# Patient Record
Sex: FEMALE | Race: OTHER | Employment: OTHER | URBAN - METROPOLITAN AREA
[De-identification: names, ages, dates, MRNs, and addresses within clinical notes are randomized per-mention and may not be internally consistent; named-entity substitution may affect disease eponyms.]

---

## 2017-02-18 LAB
ALBUMIN SERPL-MCNC: 4.2 G/DL (ref 3.6–4.8)
ALBUMIN/GLOB SERPL: 1.6 {RATIO} (ref 1.1–2.5)
ALP SERPL-CCNC: 77 IU/L (ref 39–117)
ALT SERPL-CCNC: 24 IU/L (ref 0–32)
AST SERPL-CCNC: 20 IU/L (ref 0–40)
BASOPHILS # BLD MANUAL: 0.1 X10E3/UL (ref 0–0.2)
BASOPHILS NFR BLD MANUAL: 1 %
BILIRUB SERPL-MCNC: 0.2 MG/DL (ref 0–1.2)
BUN SERPL-MCNC: 15 MG/DL (ref 8–27)
BUN/CREAT SERPL: 20 (ref 11–26)
CALCIUM SERPL-MCNC: 9.5 MG/DL (ref 8.7–10.3)
CHLORIDE SERPL-SCNC: 96 MMOL/L (ref 96–106)
CO2 SERPL-SCNC: 22 MMOL/L (ref 18–29)
CREAT SERPL-MCNC: 0.76 MG/DL (ref 0.57–1)
DIFFERENTIAL COMMENT, 115260: NORMAL
EOSINOPHIL # BLD MANUAL: 0.2 X10E3/UL (ref 0–0.4)
EOSINOPHIL NFR BLD MANUAL: 3 %
ERYTHROCYTE [DISTWIDTH] IN BLOOD BY AUTOMATED COUNT: 14.5 % (ref 12.3–15.4)
ERYTHROCYTE [SEDIMENTATION RATE] IN BLOOD BY WESTERGREN METHOD: 4 MM/HR (ref 0–40)
GLOBULIN SER CALC-MCNC: 2.6 G/DL (ref 1.5–4.5)
GLUCOSE SERPL-MCNC: 89 MG/DL (ref 65–99)
HCT VFR BLD AUTO: 41.9 % (ref 34–46.6)
HGB BLD-MCNC: 13.7 G/DL (ref 11.1–15.9)
LYMPHOCYTES # BLD MANUAL: 1.8 X10E3/UL (ref 0.7–3.1)
LYMPHOCYTES NFR BLD MANUAL: 33 %
MCH RBC QN AUTO: 28.4 PG (ref 26.6–33)
MCHC RBC AUTO-ENTMCNC: 32.7 G/DL (ref 31.5–35.7)
MCV RBC AUTO: 87 FL (ref 79–97)
MONOCYTES # BLD MANUAL: 0.8 X10E3/UL (ref 0.1–0.9)
MONOCYTES NFR BLD MANUAL: 14 %
NEUTROPHILS # BLD MANUAL: 2.7 X10E3/UL (ref 1.4–7)
NEUTROPHILS NFR BLD MANUAL: 49 %
PLATELET # BLD AUTO: 304 X10E3/UL (ref 150–379)
PLATELET BLD QL SMEAR: ADEQUATE
POTASSIUM SERPL-SCNC: 4.1 MMOL/L (ref 3.5–5.2)
PROT SERPL-MCNC: 6.8 G/DL (ref 6–8.5)
RBC # BLD AUTO: 4.83 X10E6/UL (ref 3.77–5.28)
RBC MORPH BLD: NORMAL
SODIUM SERPL-SCNC: 140 MMOL/L (ref 134–144)
WBC # BLD AUTO: 5.5 X10E3/UL (ref 3.4–10.8)

## 2017-04-14 ENCOUNTER — TELEPHONE (OUTPATIENT)
Dept: RHEUMATOLOGY | Age: 72
End: 2017-04-14

## 2017-04-14 DIAGNOSIS — Z79.60 LONG-TERM USE OF IMMUNOSUPPRESSANT MEDICATION: ICD-10-CM

## 2017-04-14 DIAGNOSIS — M06.09 SERONEGATIVE RHEUMATOID ARTHRITIS OF MULTIPLE SITES (HCC): Primary | ICD-10-CM

## 2017-04-15 LAB
ALBUMIN SERPL-MCNC: 4 G/DL (ref 3.5–4.8)
ALBUMIN/GLOB SERPL: 1.9 {RATIO} (ref 1.2–2.2)
ALP SERPL-CCNC: 72 IU/L (ref 39–117)
ALT SERPL-CCNC: 22 IU/L (ref 0–32)
AST SERPL-CCNC: 25 IU/L (ref 0–40)
BASOPHILS # BLD MANUAL: 0 X10E3/UL (ref 0–0.2)
BASOPHILS NFR BLD MANUAL: 0 %
BILIRUB SERPL-MCNC: 0.4 MG/DL (ref 0–1.2)
BUN SERPL-MCNC: 13 MG/DL (ref 8–27)
BUN/CREAT SERPL: 16 (ref 12–28)
CALCIUM SERPL-MCNC: 9.2 MG/DL (ref 8.7–10.3)
CHLORIDE SERPL-SCNC: 96 MMOL/L (ref 96–106)
CO2 SERPL-SCNC: 26 MMOL/L (ref 18–29)
CREAT SERPL-MCNC: 0.8 MG/DL (ref 0.57–1)
DIFFERENTIAL COMMENT, 115260: NORMAL
EOSINOPHIL # BLD MANUAL: 0.1 X10E3/UL (ref 0–0.4)
EOSINOPHIL NFR BLD MANUAL: 3 %
ERYTHROCYTE [DISTWIDTH] IN BLOOD BY AUTOMATED COUNT: 15.3 % (ref 12.3–15.4)
ERYTHROCYTE [SEDIMENTATION RATE] IN BLOOD BY WESTERGREN METHOD: 2 MM/HR (ref 0–40)
GLOBULIN SER CALC-MCNC: 2.1 G/DL (ref 1.5–4.5)
GLUCOSE SERPL-MCNC: 92 MG/DL (ref 65–99)
HCT VFR BLD AUTO: 39.3 % (ref 34–46.6)
HGB BLD-MCNC: 13.2 G/DL (ref 11.1–15.9)
LYMPHOCYTES # BLD MANUAL: 1.4 X10E3/UL (ref 0.7–3.1)
LYMPHOCYTES NFR BLD MANUAL: 34 %
MCH RBC QN AUTO: 28.9 PG (ref 26.6–33)
MCHC RBC AUTO-ENTMCNC: 33.6 G/DL (ref 31.5–35.7)
MCV RBC AUTO: 86 FL (ref 79–97)
MONOCYTES # BLD MANUAL: 0.8 X10E3/UL (ref 0.1–0.9)
MONOCYTES NFR BLD MANUAL: 19 %
NEUTROPHILS # BLD MANUAL: 1.8 X10E3/UL (ref 1.4–7)
NEUTROPHILS NFR BLD MANUAL: 44 %
PLATELET # BLD AUTO: 209 X10E3/UL (ref 150–379)
PLATELET BLD QL SMEAR: ADEQUATE
POTASSIUM SERPL-SCNC: 4.4 MMOL/L (ref 3.5–5.2)
PROT SERPL-MCNC: 6.1 G/DL (ref 6–8.5)
RBC # BLD AUTO: 4.56 X10E6/UL (ref 3.77–5.28)
RBC MORPH BLD: NORMAL
SODIUM SERPL-SCNC: 138 MMOL/L (ref 134–144)
WBC # BLD AUTO: 4.2 X10E3/UL (ref 3.4–10.8)

## 2017-04-21 ENCOUNTER — OFFICE VISIT (OUTPATIENT)
Dept: RHEUMATOLOGY | Age: 72
End: 2017-04-21

## 2017-04-21 VITALS
BODY MASS INDEX: 28.96 KG/M2 | HEIGHT: 66 IN | WEIGHT: 180.2 LBS | HEART RATE: 64 BPM | TEMPERATURE: 97.2 F | SYSTOLIC BLOOD PRESSURE: 142 MMHG | DIASTOLIC BLOOD PRESSURE: 72 MMHG | RESPIRATION RATE: 20 BRPM | OXYGEN SATURATION: 97 %

## 2017-04-21 DIAGNOSIS — R79.89 ELEVATED LIVER FUNCTION TESTS: ICD-10-CM

## 2017-04-21 DIAGNOSIS — Z79.60 LONG-TERM USE OF IMMUNOSUPPRESSANT MEDICATION: ICD-10-CM

## 2017-04-21 DIAGNOSIS — M06.09 SERONEGATIVE RHEUMATOID ARTHRITIS OF MULTIPLE SITES (HCC): Primary | Chronic | ICD-10-CM

## 2017-04-21 RX ORDER — PREDNISONE 5 MG/1
TABLET ORAL
Qty: 30 TAB | Refills: 0 | Status: SHIPPED | OUTPATIENT
Start: 2017-04-21 | End: 2021-06-30 | Stop reason: ALTCHOICE

## 2017-04-21 NOTE — PROGRESS NOTES
HISTORY OF PRESENT ILLNESS  Aleksandra Dailey is a 81 y.o. female. HPI Patient presents for follow up rheumatoid arthritis. \"I'm having a worse time with my hands. \" Symptoms have been worse over the past few months. She has some pain in the neck and shoulders as well. She has AM stiffness of less than 30 minutes. She has swelling of the fingers and left ankle. She notes no recent fevers or infections. She is taking methotrexate 10 mg weekly. She takes acetaminophen up to 1300 mg daily, if needed, with mild relief. No regular exercise is noted. Current Outpatient Prescriptions   Medication Sig Dispense Refill    tiZANidine (ZANAFLEX) 2 mg tablet Take 1 Tab by mouth nightly as needed. Will make drowsy (not for use with any sleep medications) 30 Tab 1    methotrexate (RHEUMATREX) 2.5 mg tablet TAKE 6 TABLETS BY MOUTH EVERY WEEK ON THURSDAY (Patient taking differently: patient states she is on 4 tablets once weekly 7/2016) 78 Tab 0    pantoprazole (PROTONIX) 40 mg tablet TK 1 T PO QD  2    folic acid (FOLVITE) 1 mg tablet Take 1 Tab by mouth daily. 90 Tab 3    modafinil (PROVIGIL) 200 mg tablet Take 200 mg by mouth daily as needed. 1    acetaminophen (TYLENOL) 650 mg CR tablet Take 1,300 mg by mouth daily as needed for Pain.  citalopram (CELEXA) 20 mg tablet Take  by mouth daily.  CALCIUM PHOSPHATE TRIB/VIT D3 (CITRACAL + D PO) Take 630 mg by mouth daily. D3 = 500 IU.  fluticasone (FLONASE) 50 mcg/Actuation nasal spray 1 [place] by Both Nostrils route two (2) times a day.  lisinopril (PRINIVIL, ZESTRIL) 20 mg tablet Take 20 mg by mouth daily.  hydrochlorothiazide (HYDRODIURIL) 25 mg tablet Take 25 mg by mouth daily.  loratadine (CLARITIN) 10 mg tablet Take 10 mg by mouth daily as needed.  cpap machine kit 1 Each by Does Not Apply route.  CPAP @@ 12 cm H2O qhs   Indications: SLEEP APNEA      VAGIFEM 10 mcg tab vaginal tablet INSERT 1 T VAGINALLY TWICE WEEKLY FOR 30 DAYS  3 Allergies   Allergen Reactions    Cephalosporins Unknown (comments)     Pt states she is not allergic to this.  Cyclobenzaprine Other (comments)     dysphoria    Keflex [Cephalexin] Anxiety    Plaquenil [Hydroxychloroquine] Diarrhea    Sulfa (Sulfonamide Antibiotics) Unknown (comments)     Unsure of reaction. Review of Systems   Constitutional: Negative for fever. Eyes: Negative for blurred vision. Respiratory: Negative for cough. Cardiovascular: Positive for leg swelling. Gastrointestinal: Negative for abdominal pain. Physical Exam   Vitals reviewed. GENERAL: well developed, well nourished, in no apparent distress    E/N/T: Oropharynx: normal mucosa, palate, and posterior pharynx;    NECK: Neck is supple with mildly reduced bilateral lateral rotation with pain. Tenderness lower cervical paraspinals with spasm  RESPIRATORY:  Lungs clear with BS=B/L    CARDIOVASCULAR: regular rhythm; right full leg compression stocking in place; no edema left leg.; +1 edema right leg    GASTROINTESTINAL: nontender; no organomegaly    LYMPHATIC: no enlargement of cervical nodes;    MUSCULOSKELETAL: digits/nails: squaring right first Aia 16; Heberden's and Bonnie's nodes each hand. Normal gait; range of motion: right knee flexion 90 degrees. Rest of peripheral joints FROM. Full peripheral joint examination with synovitis left 2nd MCP, right 2-3 MCPS, bilateral hands 2-5 pips, left ankle and mtps.    NEURO: tone normal  SKIN: no ulcerations, lesions or rashes no skin thickening   PSYCHIATRIC: mental status: alert and oriented x 3; good insight and judgement    Lab Results   Component Value Date/Time    WBC 4.2 04/14/2017 11:52 AM    HGB 13.2 04/14/2017 11:52 AM    HCT 39.3 04/14/2017 11:52 AM    PLATELET 676 54/79/2072 11:52 AM    MCV 86 04/14/2017 11:52 AM     Lab Results   Component Value Date/Time    Sodium 138 04/14/2017 11:52 AM    Potassium 4.4 04/14/2017 11:52 AM    Chloride 96 04/14/2017 11:52 AM CO2 26 04/14/2017 11:52 AM    Anion gap 7 11/18/2011 09:15 AM    Glucose 92 04/14/2017 11:52 AM    BUN 13 04/14/2017 11:52 AM    Creatinine 0.80 04/14/2017 11:52 AM    BUN/Creatinine ratio 16 04/14/2017 11:52 AM    GFR est AA 86 04/14/2017 11:52 AM    GFR est non-AA 75 04/14/2017 11:52 AM    Calcium 9.2 04/14/2017 11:52 AM    Bilirubin, total 0.4 04/14/2017 11:52 AM    AST (SGOT) 25 04/14/2017 11:52 AM    Alk. phosphatase 72 04/14/2017 11:52 AM    Protein, total 6.1 04/14/2017 11:52 AM    Albumin 4.0 04/14/2017 11:52 AM    Globulin 2.7 11/18/2011 09:15 AM    A-G Ratio 1.9 04/14/2017 11:52 AM    ALT (SGPT) 22 04/14/2017 11:52 AM     Lab Results   Component Value Date/Time    Sed rate (ESR) 2 04/14/2017 11:52 AM     MHAQ zero  CDAI 4/21/2017 12/13/2016 8/15/2016   Swollen Joint Count 11 3 0   Tender Joint Count 11 5 0   Physician Assessment (0-10) 4 3 2   Patient Assessment (0-10) 4.5 4 4   CDAI 30.5 15 6     ASSESSMENT and PLAN    ICD-10-CM ICD-9-CM    1. Seronegative rheumatoid arthritis of multiple sites [facility]): Seronegative. Inflammatory markers elevated at baseline with good response to methotrexate. She had lowered from 15 mg weekly to 10 mg weekly due to mildly increased ALT. Symptoms increased from last visit. M06.09 714.0 -discussed consideration of adding Arava or returning to methotrexate 15 mg weekly. She will increase methotrexate to 15 mg weekly  -she is leery of [facility]. If needed, consider Enbrel if not improving or if liver testing increases with higher methotrexate dosing  -comfortable, low impact exercise encouraged  -prednisone 10 mg once daily. Taper by 2.5 mg every 3 days or as directed   2. Long-term use of immunosuppressant medication Z79.899 V58.69 HEPATIC FUNCTION PANEL   3. Elevated liver function tests: mild, 3 times last year.  Normalized after lowering methotrexate R94.5 790.6 HEPATIC FUNCTION PANEL-4 weeks  See above  If increased after returning to methotrexate 15 mg weekly, return to 10 mg weekly and add different arthritis therapy

## 2017-04-21 NOTE — MR AVS SNAPSHOT
Visit Information Date & Time Provider Department Dept. Phone Encounter #  
 4/21/2017 11:30 AM Calin Lux MD [facility] [id number] Follow-up Instructions Return in about 2 months (around 6/21/2017). Upcoming Health Maintenance Date Due Hepatitis C Screening 1945 DTaP/Tdap/Td series (1 - Tdap) 2/26/1968 FOBT Q 1 YEAR AGE 50-75 2/26/1997 ZOSTER VACCINE AGE 60> 2/26/2007 MEDICARE YEARLY EXAM 6/19/2017 GLAUCOMA SCREENING Q2Y 9/14/2018 BREAST CANCER SCRN MAMMOGRAM 12/7/2018 Allergies as of 4/21/2017  Review Complete On: 4/21/2017 By: Calin Lux MD  
  
 Severity Noted Reaction Type Reactions Cephalosporins  01/24/2012    Unknown (comments) Pt states she is not allergic to this. Cyclobenzaprine  12/16/2015    Other (comments) dysphoria Keflex [Cephalexin]  04/18/2013    Anxiety Plaquenil [Hydroxychloroquine]  10/16/2013    Diarrhea Sulfa (Sulfonamide Antibiotics)  04/18/2013    Unknown (comments) Unsure of reaction. Current Immunizations  Reviewed on 12/13/2016 Name Date Influenza Vaccine 8/31/2016, 10/16/2015, 9/15/2014, 9/23/2013 Influenza Vaccine Whole 10/14/2011 Pneumococcal Vaccine (Unspecified Type) 10/27/2014, 5/27/2009 Not reviewed this visit You Were Diagnosed With   
  
 Codes Comments Seronegative rheumatoid arthritis of multiple sites [facility])    -  Primary ICD-10-CM: M06.09 
ICD-9-CM: 714.0 Long-term use of immunosuppressant medication     ICD-10-CM: Z79.899 ICD-9-CM: V58.69 Elevated liver function tests     ICD-10-CM: R94.5 ICD-9-CM: 790.6 Vitals BP Pulse Temp Resp Height(growth percentile) Weight(growth percentile) 142/72 (BP 1 Location: Left arm, BP Patient Position: Sitting) 64 97.2 °F (36.2 °C) (Oral) 20 5' 5.5\" (1.664 m) 180 lb 3.2 oz (81.7 kg) SpO2 BMI OB Status Smoking Status 97% 29.53 kg/m2 Postmenopausal Former Smoker Vitals History BMI and BSA Data Body Mass Index Body Surface Area  
 29.53 kg/m 2 1.94 m 2 Preferred Pharmacy Pharmacy Name Phone [facility] [address], [address] [facility] AT [address] [phone] Your Updated Medication List  
  
   
This list is accurate as of: 4/21/17 12:05 PM.  Always use your most recent med list.  
  
  
  
  
 acetaminophen 650 mg CR tablet Commonly known as:  TYLENOL Take 1,300 mg by mouth daily as needed for Pain. CeleXA 20 mg tablet Generic drug:  citalopram  
Take  by mouth daily. CITRACAL + D PO Take 630 mg by mouth daily. D3 = 500 IU. CLARITIN 10 mg tablet Generic drug:  loratadine Take 10 mg by mouth daily as needed. cpap machine kit 1 Each by Does Not Apply route. CPAP @@ 12 cm H2O qhs   Indications: SLEEP APNEA  
  
 fluticasone 50 mcg/actuation nasal spray Commonly known as:  FLONASE  
1 [place] by Both Nostrils route two (2) times a day. folic acid 1 mg tablet Commonly known as:  [employer] Take 1 Tab by mouth daily. hydroCHLOROthiazide 25 mg tablet Commonly known as:  HYDRODIURIL Take 25 mg by mouth daily. lisinopril 20 mg tablet Commonly known as:  Fredy Peterson Take 20 mg by mouth daily. methotrexate 2.5 mg tablet Commonly known as:  RHEUMATREX  
TAKE 6 TABLETS BY MOUTH EVERY WEEK ON THURSDAY  
  
 modafinil 200 mg tablet Commonly known as:  PROVIGIL Take 200 mg by mouth daily as needed. pantoprazole 40 mg tablet Commonly known as:  PROTONIX TK 1 T PO QD  
  
 predniSONE 5 mg tablet Commonly known as:  DELTASONE  
10 mg once daily. Taper by 2.5 mg every 3 days or as directed  
  
 tiZANidine 2 mg tablet Commonly known as:  Jerson Core Take 1 Tab by mouth nightly as needed. Will make drowsy (not for use with any sleep medications) VAGIFEM 10 mcg Tab vaginal tablet Generic drug:  estradiol INSERT 1 T VAGINALLY TWICE WEEKLY FOR 30 DAYS Prescriptions Sent to Pharmacy Refills  
 predniSONE (DELTASONE) 5 mg tablet 0 Sig: 10 mg once daily. Taper by 2.5 mg every 3 days or as directed Class: Normal  
 Pharmacy: [facility] [address], [facility] [address]  #: [phone] Follow-up Instructions Return in about 2 months (around 6/21/2017). To-Do List   
 05/22/2017 Lab:  HEPATIC FUNCTION PANEL Patient Instructions Methotrexate to 6 pills once weekly Liver testing about 4 weeks Introducing [facility] SERVICES! Dear Jono : 
Thank you for requesting a [facility] account. Our records indicate that you already have an active [facility] account. You can access your account anytime at [email or web address] Did you know that you can access your hospital and ER discharge instructions at any time in [facility]? You can also review all of your test results from your hospital stay or ER visit. Additional Information If you have questions, please visit the Frequently Asked Questions section of the [facility] website at [email or web address] Remember, [facility] is NOT to be used for urgent needs. For medical emergencies, dial 911. Now available from your iPhone and Android! Please provide this summary of care documentation to your next provider. Your primary care clinician is listed as Jamila Recinos. If you have any questions after today's visit, please call [phone].

## 2017-05-25 NOTE — LETTER
6/15/2017 12:31 PM 
 
Ms. Nain Mixon [address] [facility]. [place] [address] Dear Nain Mixon: We have been attempting to reach you but have been unsuccessful. Please find your most recent results below. Resulted Orders HEPATIC FUNCTION PANEL Result Value Ref Range Protein, total 6.2 6.0 - 8.5 g/dL Albumin 4.1 3.5 - 4.8 g/dL Bilirubin, total 0.3 0.0 - 1.2 mg/dL Bilirubin, direct 0.12 0.00 - 0.40 mg/dL Alk. phosphatase 67 39 - 117 IU/L  
 AST (SGOT) 23 0 - 40 IU/L  
 ALT (SGPT) 26 0 - 32 IU/L Narrative Performed at:  [address]  [id number] : Kika Aguillon MD, Phone:  [phone] CBC+PLATELET+HEM REVIEW Result Value Ref Range WBC 3.6 3.4 - 10.8 x10E3/uL  
 RBC 4.69 3.77 - 5.28 x10E6/uL HGB 13.4 11.1 - 15.9 g/dL HCT 39.8 34.0 - 46.6 % MCV 85 79 - 97 fL  
 MCH 28.6 26.6 - 33.0 pg  
 MCHC 33.7 31.5 - 35.7 g/dL  
 RDW 15.0 12.3 - 15.4 % PLATELET 605 927 - 428 x10E3/uL NEUTROPHILS 47 % LYMPHOCYTES 34 % MONOCYTES 17 % EOSINOPHILS 2 % BASOPHILS 0 %  
 ABS. NEUTROPHILS 1.7 1.4 - 7.0 X10E3/uL  
 ABS. LYMPHOCYTES 1.2 0.7 - 3.1 X10E3/uL  
 ABS. MONOCYTES 0.6 0.1 - 0.9 X10E3/uL  
 ABS. EOSINOPHILS 0.1 0.0 - 0.4 X10E3/uL  
 ABS. BASOPHILS 0.0 0.0 - 0.2 X10E3/uL Differential comment Comment Comment:  
   Verified by microscopic examination. RBC COMMENT RBC's appear normal. Normal  
 PLATELET COMMENT Adequate Adequate Narrative Performed at:  [address]  [id number] : Kika Aguillon MD, Phone:  [phone] METABOLIC PANEL, COMPREHENSIVE Result Value Ref Range Glucose 109 (H) 65 - 99 mg/dL BUN 16 8 - 27 mg/dL Creatinine 0.80 0.57 - 1.00 mg/dL GFR est non-AA 75 >59 mL/min/1.73 GFR est AA 86 >59 mL/min/1.73  
 BUN/Creatinine ratio 20 12 - 28  Sodium 137 134 - 144 mmol/L  
 Potassium 4.4 3.5 - 5.2 mmol/L Chloride 97 96 - 106 mmol/L  
 CO2 27 18 - 29 mmol/L Calcium 9.3 8.7 - 10.3 mg/dL Protein, total 6.1 6.0 - 8.5 g/dL Albumin 3.9 3.5 - 4.8 g/dL GLOBULIN, TOTAL 2.2 1.5 - 4.5 g/dL A-G Ratio 1.8 1.2 - 2.2 Bilirubin, total 0.3 0.0 - 1.2 mg/dL Alk. phosphatase 74 39 - 117 IU/L  
 AST (SGOT) 26 0 - 40 IU/L  
 ALT (SGPT) 30 0 - 32 IU/L Narrative Performed at:  [address]  [id number] : Patricia Medrano MD, Phone:  [phone] SED RATE (ESR) Result Value Ref Range Sed rate (ESR) 2 0 - 40 mm/hr Narrative Performed at:  [address]  [id number] : Patricia Medrano MD, Phone:  [phone] RECOMMENDATIONS/COMMENTS: 
Your glucose was 109. The rest of your labs were okay. Please call me if you have any questions: [phone] Sincerely, Mago Herrmann MD

## 2017-05-26 LAB
ALBUMIN SERPL-MCNC: 4.1 G/DL (ref 3.5–4.8)
ALP SERPL-CCNC: 67 IU/L (ref 39–117)
ALT SERPL-CCNC: 26 IU/L (ref 0–32)
AST SERPL-CCNC: 23 IU/L (ref 0–40)
BILIRUB DIRECT SERPL-MCNC: 0.12 MG/DL (ref 0–0.4)
BILIRUB SERPL-MCNC: 0.3 MG/DL (ref 0–1.2)
PROT SERPL-MCNC: 6.2 G/DL (ref 6–8.5)

## 2017-06-14 LAB
ALBUMIN SERPL-MCNC: 3.9 G/DL (ref 3.5–4.8)
ALBUMIN/GLOB SERPL: 1.8 {RATIO} (ref 1.2–2.2)
ALP SERPL-CCNC: 74 IU/L (ref 39–117)
ALT SERPL-CCNC: 30 IU/L (ref 0–32)
AST SERPL-CCNC: 26 IU/L (ref 0–40)
BASOPHILS # BLD MANUAL: 0 X10E3/UL (ref 0–0.2)
BASOPHILS NFR BLD MANUAL: 0 %
BILIRUB SERPL-MCNC: 0.3 MG/DL (ref 0–1.2)
BUN SERPL-MCNC: 16 MG/DL (ref 8–27)
BUN/CREAT SERPL: 20 (ref 12–28)
CALCIUM SERPL-MCNC: 9.3 MG/DL (ref 8.7–10.3)
CHLORIDE SERPL-SCNC: 97 MMOL/L (ref 96–106)
CO2 SERPL-SCNC: 27 MMOL/L (ref 18–29)
CREAT SERPL-MCNC: 0.8 MG/DL (ref 0.57–1)
DIFFERENTIAL COMMENT, 115260: NORMAL
EOSINOPHIL # BLD MANUAL: 0.1 X10E3/UL (ref 0–0.4)
EOSINOPHIL NFR BLD MANUAL: 2 %
ERYTHROCYTE [DISTWIDTH] IN BLOOD BY AUTOMATED COUNT: 15 % (ref 12.3–15.4)
ERYTHROCYTE [SEDIMENTATION RATE] IN BLOOD BY WESTERGREN METHOD: 2 MM/HR (ref 0–40)
GLOBULIN SER CALC-MCNC: 2.2 G/DL (ref 1.5–4.5)
GLUCOSE SERPL-MCNC: 109 MG/DL (ref 65–99)
HCT VFR BLD AUTO: 39.8 % (ref 34–46.6)
HGB BLD-MCNC: 13.4 G/DL (ref 11.1–15.9)
LYMPHOCYTES # BLD MANUAL: 1.2 X10E3/UL (ref 0.7–3.1)
LYMPHOCYTES NFR BLD MANUAL: 34 %
MCH RBC QN AUTO: 28.6 PG (ref 26.6–33)
MCHC RBC AUTO-ENTMCNC: 33.7 G/DL (ref 31.5–35.7)
MCV RBC AUTO: 85 FL (ref 79–97)
MONOCYTES # BLD MANUAL: 0.6 X10E3/UL (ref 0.1–0.9)
MONOCYTES NFR BLD MANUAL: 17 %
NEUTROPHILS # BLD MANUAL: 1.7 X10E3/UL (ref 1.4–7)
NEUTROPHILS NFR BLD MANUAL: 47 %
PLATELET # BLD AUTO: 209 X10E3/UL (ref 150–379)
PLATELET BLD QL SMEAR: ADEQUATE
POTASSIUM SERPL-SCNC: 4.4 MMOL/L (ref 3.5–5.2)
PROT SERPL-MCNC: 6.1 G/DL (ref 6–8.5)
RBC # BLD AUTO: 4.69 X10E6/UL (ref 3.77–5.28)
RBC MORPH BLD: NORMAL
SODIUM SERPL-SCNC: 137 MMOL/L (ref 134–144)
WBC # BLD AUTO: 3.6 X10E3/UL (ref 3.4–10.8)

## 2017-06-15 NOTE — PROGRESS NOTES
Left message for patient to return phone call. Reason for call:  Results/instructions:  2nd Attempt; letter sent.

## 2017-07-30 RX ORDER — ATROPINE SULFATE 0.1 MG/ML
0.5 INJECTION INTRAVENOUS
Status: CANCELLED | OUTPATIENT
Start: 2017-07-30 | End: 2017-07-30

## 2017-07-30 RX ORDER — DEXTROMETHORPHAN/PSEUDOEPHED 2.5-7.5/.8
1.2 DROPS ORAL
Status: CANCELLED | OUTPATIENT
Start: 2017-07-30

## 2017-07-30 RX ORDER — SODIUM CHLORIDE 0.9 % (FLUSH) 0.9 %
5-10 SYRINGE (ML) INJECTION EVERY 8 HOURS
Status: CANCELLED | OUTPATIENT
Start: 2017-07-30 | End: 2017-07-30

## 2017-07-30 RX ORDER — SODIUM CHLORIDE 0.9 % (FLUSH) 0.9 %
5-10 SYRINGE (ML) INJECTION AS NEEDED
Status: CANCELLED | OUTPATIENT
Start: 2017-07-30 | End: 2017-07-30

## 2017-07-30 RX ORDER — SODIUM CHLORIDE 9 MG/ML
150 INJECTION, SOLUTION INTRAVENOUS CONTINUOUS
Status: CANCELLED | OUTPATIENT
Start: 2017-07-30 | End: 2017-07-30

## 2017-07-30 RX ORDER — EPINEPHRINE 0.1 MG/ML
1 INJECTION INTRACARDIAC; INTRAVENOUS
Status: CANCELLED | OUTPATIENT
Start: 2017-07-30 | End: 2017-07-30

## 2017-07-30 RX ORDER — MIDAZOLAM HYDROCHLORIDE 1 MG/ML
.25-5 INJECTION, SOLUTION INTRAMUSCULAR; INTRAVENOUS
Status: CANCELLED | OUTPATIENT
Start: 2017-07-30 | End: 2017-07-30

## 2017-07-31 ENCOUNTER — ANESTHESIA EVENT (OUTPATIENT)
Dept: ENDOSCOPY | Age: 72
End: 2017-07-31
Payer: MEDICARE

## 2017-08-01 ENCOUNTER — ANESTHESIA (OUTPATIENT)
Dept: ENDOSCOPY | Age: 72
End: 2017-08-01
Payer: MEDICARE

## 2017-08-01 ENCOUNTER — HOSPITAL ENCOUNTER (OUTPATIENT)
Age: 72
Setting detail: OUTPATIENT SURGERY
Discharge: HOME | End: 2017-08-01
Attending: INTERNAL MEDICINE | Admitting: INTERNAL MEDICINE
Payer: MEDICARE

## 2017-08-01 VITALS
OXYGEN SATURATION: 100 % | WEIGHT: 180 LBS | SYSTOLIC BLOOD PRESSURE: 164 MMHG | DIASTOLIC BLOOD PRESSURE: 84 MMHG | BODY MASS INDEX: 29.5 KG/M2 | HEART RATE: 65 BPM | TEMPERATURE: 98.1 F | RESPIRATION RATE: 24 BRPM

## 2017-08-01 LAB
H PYLORI FROM TISSUE: NEGATIVE
KIT LOT NO., HCLOLOT: NORMAL
NEGATIVE CONTROL: NEGATIVE
POSITIVE CONTROL: POSITIVE

## 2017-08-01 PROCEDURE — 74011000250 HC RX REV CODE- 250

## 2017-08-01 PROCEDURE — 87077 CULTURE AEROBIC IDENTIFY: CPT | Performed by: INTERNAL MEDICINE

## 2017-08-01 PROCEDURE — 74011250636 HC RX REV CODE- 250/636

## 2017-08-01 PROCEDURE — 76060000031 HC ANESTHESIA FIRST 0.5 HR: Performed by: INTERNAL MEDICINE

## 2017-08-01 PROCEDURE — 88305 TISSUE EXAM BY PATHOLOGIST: CPT | Performed by: INTERNAL MEDICINE

## 2017-08-01 PROCEDURE — 77030009426 HC FCPS BIOP ENDOSC BSC -B: Performed by: INTERNAL MEDICINE

## 2017-08-01 PROCEDURE — 76040000019: Performed by: INTERNAL MEDICINE

## 2017-08-01 RX ORDER — PROPOFOL 10 MG/ML
INJECTION, EMULSION INTRAVENOUS AS NEEDED
Status: DISCONTINUED | OUTPATIENT
Start: 2017-08-01 | End: 2017-08-01 | Stop reason: HOSPADM

## 2017-08-01 RX ORDER — SODIUM CHLORIDE 9 MG/ML
INJECTION, SOLUTION INTRAVENOUS
Status: DISCONTINUED | OUTPATIENT
Start: 2017-08-01 | End: 2017-08-01 | Stop reason: HOSPADM

## 2017-08-01 RX ORDER — LIDOCAINE HYDROCHLORIDE 20 MG/ML
INJECTION, SOLUTION EPIDURAL; INFILTRATION; INTRACAUDAL; PERINEURAL AS NEEDED
Status: DISCONTINUED | OUTPATIENT
Start: 2017-08-01 | End: 2017-08-01 | Stop reason: HOSPADM

## 2017-08-01 RX ADMIN — PROPOFOL 50 MG: 10 INJECTION, EMULSION INTRAVENOUS at 09:55

## 2017-08-01 RX ADMIN — PROPOFOL 50 MG: 10 INJECTION, EMULSION INTRAVENOUS at 09:57

## 2017-08-01 RX ADMIN — LIDOCAINE HYDROCHLORIDE 40 MG: 20 INJECTION, SOLUTION EPIDURAL; INFILTRATION; INTRACAUDAL; PERINEURAL at 09:55

## 2017-08-01 RX ADMIN — PROPOFOL 20 MG: 10 INJECTION, EMULSION INTRAVENOUS at 09:59

## 2017-08-01 RX ADMIN — SODIUM CHLORIDE: 9 INJECTION, SOLUTION INTRAVENOUS at 09:52

## 2017-08-01 NOTE — ANESTHESIA POSTPROCEDURE EVALUATION
Post-Anesthesia Evaluation and Assessment    Patient: Belia Prakash MRN: [id number]  SSN: [id number]    YOB: 1945  Age: 81 y.o. Sex: female       Cardiovascular Function/Vital Signs  Visit Vitals    /84    Pulse 65    Temp 36.7 °C (98.1 °F)    Resp 24    Wt 81.6 kg (180 lb)    SpO2 100%    BMI 29.5 kg/m2       Patient is status post MAC anesthesia for Procedure(s):  ESOPHAGOGASTRODUODENOSCOPY (EGD)  ESOPHAGOGASTRODUODENAL (EGD) BIOPSY. Nausea/Vomiting: None    Postoperative hydration reviewed and adequate. Pain:  Pain Scale 1: Numeric (0 - 10) (08/01/17 1045)  Pain Intensity 1: 0 (08/01/17 1045)   Managed    Neurological Status: At baseline    Mental Status and Level of Consciousness: Arousable    Pulmonary Status:   O2 Device: Room air (08/01/17 1045)   Adequate oxygenation and airway patent    Complications related to anesthesia: None    Post-anesthesia assessment completed.  No concerns    Signed By: Anabelle Quintana MD     August 1, 2017

## 2017-08-01 NOTE — H&P
[address], [address] [place] [address]          Pre-procedure History and Physical       NAME:  Clifford Green   :   1945   MRN:   [id number]     CHIEF COMPLAINT/HPI: See procedure note    PMH:  Past Medical History:   Diagnosis Date    Asthma     Autoimmune disease ([facility]     RA    Benign breast cyst in female     Difficult intubation 2000    GERD (gastroesophageal reflux disease)     Hypertension     Lymphedema     right leg    Nausea & vomiting     YOEL on CPAP     Osteoarthritis 2009    Other ill-defined conditions     wears surgical stocking on right leg    Other ill-defined conditions     hx collapsed left lung    Pleurisy     Pneumonia     Psychiatric disorder     depression    Rheumatoid arthritis ([facility] 2009    Seasonal allergies     Sleep apnea     uses CPAP       PSH:  Past Surgical History:   Procedure Laterality Date    BREAST SURGERY PROCEDURE UNLISTED      Biopsy breast (benign)    COLONOSCOPY      HC BLD CARPEL TUNNEL RELEASE      right    HX APPENDECTOMY      HX CARPAL TUNNEL RELEASE Left 2016    HX COLONOSCOPY  2013    HX CYST REMOVAL      from right leg - lymphangioma    HX DILATION AND CURETTAGE      HX GYN      right ovary removed    HX ORTHOPAEDIC      left and right shoulder - acromioplasty/rotator cuff repair/bone spur    HX TONSILLECTOMY      HX TUBAL LIGATION      NEUROLOGICAL PROCEDURE UNLISTED      pt states she has not had a neurological procedure       Allergies: Allergies   Allergen Reactions    Cephalosporins Unknown (comments)     Pt states she is not allergic to this.  Cyclobenzaprine Other (comments)     dysphoria    Keflex [Cephalexin] Anxiety    Plaquenil [Hydroxychloroquine] Diarrhea    Sulfa (Sulfonamide Antibiotics) Unknown (comments)     Unsure of reaction. Home Medications:  Prior to Admission Medications   Prescriptions Last Dose Informant Patient Reported? Taking? CALCIUM PHOSPHATE TRIB/VIT D3 (CITRACAL + D PO)   Yes No   Sig: Take 630 mg by mouth daily. D3 = 500 IU. VAGIFEM 10 mcg tab vaginal tablet   Yes No   Sig: INSERT 1 T VAGINALLY TWICE WEEKLY FOR 30 DAYS   acetaminophen (TYLENOL) 650 mg CR tablet   Yes No   Sig: Take 1,300 mg by mouth daily as needed for Pain. citalopram (CELEXA) 20 mg tablet   Yes No   Sig: Take  by mouth daily. cpap machine kit   Yes No   Si Each by Does Not Apply route. CPAP @@ 12 cm H2O qhs   Indications: SLEEP APNEA   fluticasone (FLONASE) 50 mcg/Actuation nasal spray   Yes No   Si [place] by Both Nostrils route two (2) times a day. folic acid (FOLVITE) 1 mg tablet   No No   Sig: TAKE 1 TABLET BY MOUTH DAILY   hydrochlorothiazide (HYDRODIURIL) 25 mg tablet   Yes No   Sig: Take 25 mg by mouth daily. lisinopril (PRINIVIL, ZESTRIL) 20 mg tablet   Yes No   Sig: Take 20 mg by mouth daily. loratadine (CLARITIN) 10 mg tablet   Yes No   Sig: Take 10 mg by mouth daily as needed. methotrexate (RHEUMATREX) 2.5 mg tablet   No No   Sig: TAKE 6 TABLETS BY MOUTH EVERY WEEK ON THURSDAY   modafinil (PROVIGIL) 200 mg tablet   Yes No   Sig: Take 200 mg by mouth daily as needed. pantoprazole (PROTONIX) 40 mg tablet   Yes No   Sig: TK 1 T PO QD   predniSONE (DELTASONE) 5 mg tablet   No No   Sig: 10 mg once daily. Taper by 2.5 mg every 3 days or as directed   tiZANidine (ZANAFLEX) 2 mg tablet   No No   Sig: Take 1 Tab by mouth nightly as needed. Will make drowsy (not for use with any sleep medications)      Facility-Administered Medications: None       Hospital Medications:  No current facility-administered medications for this encounter.         Family History:  Family History   Problem Relation Age of Onset    Osteoporosis Mother     Dementia Mother      alzheimers    Cancer Father      ? where   [facility] Diabetes Father     Other Father      \"hardening of the arteries\"   [facility] Arthritis-rheumatoid Sister     Arthritis-osteo Sister     Asthma Sister  Stroke Brother      brain aneurysm    Cancer Maternal Grandfather      bone marrow cancer       Social History:  Social History   Substance Use Topics    Smoking status: Former Smoker    Smokeless tobacco: Not on file      Comment: former cigarette smoker - quit over 30 yrs ago    Alcohol use Yes      Comment: moderate         PHYSICAL EXAM PRIOR TO SEDATION:  General: Alert, in no acute distress    Lungs:            CTA bilaterally  Heart:  Normal S1, S2    Abdomen: Soft, Non distended, Non tender. Normoactive bowel sounds. Assessment:   Stable for sedation administration.     Plan:   · Endoscopic procedure with sedation     Signed By: Mariana Olson MD     8/1/2017  9:42 AM

## 2017-08-01 NOTE — PROCEDURES
[place] [address] ELIA Noel [facility], [address] [facility]  [phone]               Esophagogastroduodenoscopy (EGD) Procedure Note    NAME: Kavon Thomson  :  1945  MRN:  [id number]    Indications:  Abdominal bloating     : Kait Suh MD    Referring Provider:  Raven Alvares MD    Medicine:  MAC anesthesia      Procedure Details:  After informed consent was obtained with all risks and benefits of the procedure explained and preprocedure exam completed, the patient was placed in the left lateral decubitus position. Universal protocol for patient identification was performed and documented in the nursing notes. Throughout the procedure, the patient's blood pressure was monitored at least every five minutes; pulse, and oxygen saturations were monitored continuously. All vital signs were documented in the nursing notes. The endoscope was inserted into the mouth and advanced under direct vision to second portion of the duodenum. A careful inspection was made as the gastroscope was withdrawn, including a retroflexed view of the proximal stomach; findings and interventions are described below. Findings:   Esophagus: normal  Stomach: small sliding hiatal hernia, normal rest of the stomach s/p CLOtest biopsies  Duodenum: normal s/p biopsies for celiac disease    Interventions:    biopsy of stomach whole and duodenum    Specimens:     ID Type Source Tests Collected by Time Destination   1 : duodenum r/o celiac Preservative Duodenum  Kait Suh MD 2017 3047 Pathology        EBL: None          Complications:     No immediate complications        Impression:  -See post-procedure diagnoses. Recommendations:  -Await pathology. Signed by:  Kait Suh MD         2017 10:05 AM

## 2017-08-01 NOTE — PERIOP NOTES

## 2017-08-01 NOTE — DISCHARGE INSTRUCTIONS
[place] [id number] Tara Michelle M.D.  [place] [facility], [address] [facility]  [phone]         EGD [address] [facility] [place] Ferrer  [id number]  1945    DISCOMFORT:  Sore throat- throat lozenges or warm salt water gargle  Redness at IV site- apply warm compress to area; if redness or soreness persist- contact your physician  Gaseous discomfort- walking, belching will help relieve any discomfort  You may not operate a vehicle for 12 hours  You may not engage in an occupation involving machinery or appliances for the  rest of today  You may not drink alcoholic beverages for at least 12 hours  Avoid making any critical decisions for at least 24 hours    DIET:   You may resume your normal diet, but some patients find that heavy or large  meals may lead to indigestion or vomiting. I suggest a light meal as first food  intake. ACTIVITY:  You may resume your normal daily activities. It is recommended that you spend the remainder of the day resting - avoid any strenuous activity.     CALL ELIA Mckeon ANY SIGN OF:   Increasing pain, nausea, vomiting  Abdominal distension (swelling)  Significant bleeding (oral or rectal)  Fever   Pain in chest area  Shortness of breath    Additional Instructions:   Call Dr. Tara Michelle if any questions or problems at [phone]   Setup follow-up office visit in 4 weeks  EGD with small hiatal hernia

## 2017-08-01 NOTE — IP AVS SNAPSHOT
[address] 
[phone] Patient: Monet Solorzano MRN: [id number] NCO:9[birth year]01/[birth year]77 You are allergic to the following Allergen Reactions Cephalosporins Unknown (comments) Pt states she is not allergic to this. Cyclobenzaprine Other (comments) dysphoria Keflex (Cephalexin) Anxiety Plaquenil (Hydroxychloroquine) Diarrhea Sulfa (Sulfonamide Antibiotics) Unknown (comments) Unsure of reaction. Recent Documentation Weight BMI OB Status Smoking Status 81.6 kg 29.5 kg/m2 Postmenopausal Former Smoker Emergency Contacts Name Discharge Info Relation Home Work Mobile Moisés Shah DISCHARGE CAREGIVER [3] Spouse [3] [phone] Wilman Shah  Spouse [3] [phone] About your hospitalization You were admitted on:  August 1, 2017 You last received care in the:  [facility] ENDOSCOPY You were discharged on:  August 1, 2017 Unit phone number:  [phone] Why you were hospitalized Your primary diagnosis was:  Not on File Providers Seen During Your Hospitalizations Provider Role Specialty Primary office phone Radha Zhang MD Attending Provider Gastroenterology [phone] Your Primary Care Physician (PCP) Primary Care Physician Office Phone Office Fax Elkin Diaz [phone] Follow-up Information None Current Discharge Medication List  
  
CONTINUE these medications which have NOT CHANGED Dose & Instructions Dispensing Information Comments Morning Noon Evening Bedtime  
 acetaminophen 650 mg CR tablet Commonly known as:  TYLENOL Your last dose was: Your next dose is:    
   
   
 Dose:  1300 mg Take 1,300 mg by mouth daily as needed for Pain. Refills:  0 CeleXA 20 mg tablet Generic drug:  citalopram  
   
Your last dose was: Your next dose is: Take  by mouth daily. Refills:  0  
     
   
   
   
  
 CITRACAL + D PO Your last dose was: Your next dose is:    
   
   
 Dose:  630 mg Take 630 mg by mouth daily. D3 = 500 IU. Refills:  0 CLARITIN 10 mg tablet Generic drug:  loratadine Your last dose was: Your next dose is:    
   
   
 Dose:  10 mg Take 10 mg by mouth daily as needed. Refills:  0  
     
   
   
   
  
 cpap machine kit Your last dose was: Your next dose is:    
   
   
 Dose:  1 Each  
1 Each by Does Not Apply route. CPAP @@ 12 cm H2O qhs   Indications: SLEEP APNEA Refills:  0  
     
   
   
   
  
 fluticasone 50 mcg/actuation nasal spray Commonly known as:  Genie Uday Your last dose was: Your next dose is:    
   
   
 Dose:  1 Spray 1 [place] by Both Nostrils route two (2) times a day. Refills:  0  
     
   
   
   
  
 folic acid 1 mg tablet Commonly known as:  Google Your last dose was: Your next dose is: TAKE 1 TABLET BY MOUTH DAILY Quantity:  90 Tab Refills:  3  
     
   
   
   
  
 hydroCHLOROthiazide 25 mg tablet Commonly known as:  HYDRODIURIL Your last dose was: Your next dose is:    
   
   
 Dose:  25 mg Take 25 mg by mouth daily. Refills:  0  
     
   
   
   
  
 lisinopril 20 mg tablet Commonly known as:  Helon Estrin Your last dose was: Your next dose is:    
   
   
 Dose:  20 mg Take 20 mg by mouth daily. Refills:  0  
     
   
   
   
  
 methotrexate 2.5 mg tablet Commonly known as:  Missy Fraction Your last dose was: Your next dose is: TAKE 6 TABLETS BY MOUTH EVERY WEEK ON THURSDAY Quantity:  78 Tab Refills:  0  
     
   
   
   
  
 modafinil 200 mg tablet Commonly known as:  PROVIGIL  
   
 Your last dose was: Your next dose is:    
   
   
 Dose:  200 mg Take 200 mg by mouth daily as needed. Refills:  1  
     
   
   
   
  
 pantoprazole 40 mg tablet Commonly known as:  PROTONIX Your last dose was: Your next dose is:    
   
   
 TK 1 T PO QD Refills:  2  
     
   
   
   
  
 predniSONE 5 mg tablet Commonly known as:  Deshawn Hammond Your last dose was: Your next dose is:    
   
   
 10 mg once daily. Taper by 2.5 mg every 3 days or as directed Quantity:  30 Tab Refills:  0  
     
   
   
   
  
 tiZANidine 2 mg tablet Commonly known as:  Ron Hammond Your last dose was: Your next dose is:    
   
   
 Dose:  2 mg Take 1 Tab by mouth nightly as needed. Will make drowsy (not for use with any sleep medications) Quantity:  30 Tab Refills:  1 VAGIFEM 10 mcg Tab vaginal tablet Generic drug:  estradiol Your last dose was: Your next dose is:    
   
   
 INSERT 1 T VAGINALLY TWICE WEEKLY FOR 30 DAYS Refills:  3 Discharge Instructions [address] Leatha Boone. Cristino Schreiber M.D. 
[address] [place] [address] [address] 
[phone] EGD DISCHARGE INSTRUCTIONS [address] [place] [id number] 
1945 DISCOMFORT: 
Sore throat- throat lozenges or warm salt water gargle Redness at IV site- apply warm compress to area; if redness or soreness persist- contact your physician Gaseous discomfort- walking, belching will help relieve any discomfort You may not operate a vehicle for 12 hours You may not engage in an occupation involving machinery or appliances for the  rest of today You may not drink alcoholic beverages for at least 12 hours Avoid making any critical decisions for at least 24 hours DIET: 
 You may resume your normal diet, but some patients find that heavy or large  meals may lead to indigestion or vomiting. I suggest a light meal as first food  intake. ACTIVITY: 
You may resume your normal daily activities. It is recommended that you spend the remainder of the day resting - avoid any strenuous activity. CALL ELIA Melgoza ANY SIGN OF: Increasing pain, nausea, vomiting Abdominal distension (swelling) Significant bleeding (oral or rectal) Fever Pain in chest area Shortness of breath Additional Instructions: 
 Call Dr. Bacilio Leavitt if any questions or problems at [phone] Setup follow-up office visit in 4 weeks EGD with small hiatal hernia Discharge Orders None Introducing \[facility]\"" & HEALTH SERVICES! Dear Itzel Gone: 
Thank you for requesting a [employer] account. Our records indicate that you already have an active [employer] account. You can access your account anytime at [email or web address] [employer]Fly Did you know that you can access your hospital and ER discharge instructions at any time in [employer]? You can also review all of your test results from your hospital stay or ER visit. Additional Information If you have questions, please visit the Frequently Asked Questions section of the [employer] website at [email or web address] [email or web address] Remember, [employer] is NOT to be used for urgent needs. For medical emergencies, dial 911. Now available from your iPhone and Android! General Information Please provide this summary of care documentation to your next provider. Patient Signature:  ____________________________________________________________ Date:  ____________________________________________________________  
  
Andrey Rodriguez Provider Signature:  ____________________________________________________________ Date:  ____________________________________________________________

## 2017-08-01 NOTE — ANESTHESIA PREPROCEDURE EVALUATION
Anesthetic History     Increased risk of difficult airway and PONV          Review of Systems / Medical History  Patient summary reviewed, nursing notes reviewed and pertinent labs reviewed    Pulmonary            Asthma : well controlled       Neuro/Psych   Within defined limits           Cardiovascular    Hypertension: well controlled                   GI/Hepatic/Renal     GERD           Endo/Other  Within defined limits           Other Findings              Physical Exam    Airway  Mallampati: II  TM Distance: 4 - 6 cm  Neck ROM: decreased range of motion   Mouth opening: Normal     Cardiovascular  Regular rate and rhythm,  S1 and S2 normal,  no murmur, click, rub, or gallop             Dental  No notable dental hx       Pulmonary  Breath sounds clear to auscultation               Abdominal  GI exam deferred       Other Findings            Anesthetic Plan    ASA: 2  Anesthesia type: MAC          Induction: Intravenous  Anesthetic plan and risks discussed with: Patient

## 2017-08-12 LAB
ALBUMIN SERPL-MCNC: 4.3 G/DL (ref 3.5–4.8)
ALBUMIN/GLOB SERPL: 2.3 {RATIO} (ref 1.2–2.2)
ALP SERPL-CCNC: 67 IU/L (ref 39–117)
ALT SERPL-CCNC: 29 IU/L (ref 0–32)
AST SERPL-CCNC: 27 IU/L (ref 0–40)
BASOPHILS # BLD MANUAL: 0 X10E3/UL (ref 0–0.2)
BASOPHILS NFR BLD MANUAL: 1 %
BILIRUB SERPL-MCNC: 0.3 MG/DL (ref 0–1.2)
BUN SERPL-MCNC: 16 MG/DL (ref 8–27)
BUN/CREAT SERPL: 19 (ref 12–28)
CALCIUM SERPL-MCNC: 9.3 MG/DL (ref 8.7–10.3)
CHLORIDE SERPL-SCNC: 98 MMOL/L (ref 96–106)
CO2 SERPL-SCNC: 28 MMOL/L (ref 18–29)
CREAT SERPL-MCNC: 0.86 MG/DL (ref 0.57–1)
DIFFERENTIAL COMMENT, 115260: NORMAL
EOSINOPHIL # BLD MANUAL: 0.1 X10E3/UL (ref 0–0.4)
EOSINOPHIL NFR BLD MANUAL: 3 %
ERYTHROCYTE [DISTWIDTH] IN BLOOD BY AUTOMATED COUNT: 14.8 % (ref 12.3–15.4)
ERYTHROCYTE [SEDIMENTATION RATE] IN BLOOD BY WESTERGREN METHOD: 2 MM/HR (ref 0–40)
GLOBULIN SER CALC-MCNC: 1.9 G/DL (ref 1.5–4.5)
GLUCOSE SERPL-MCNC: 93 MG/DL (ref 65–99)
HCT VFR BLD AUTO: 40.8 % (ref 34–46.6)
HGB BLD-MCNC: 13.1 G/DL (ref 11.1–15.9)
LYMPHOCYTES # BLD MANUAL: 1.5 X10E3/UL (ref 0.7–3.1)
LYMPHOCYTES NFR BLD MANUAL: 30 %
MCH RBC QN AUTO: 28.4 PG (ref 26.6–33)
MCHC RBC AUTO-ENTMCNC: 32.1 G/DL (ref 31.5–35.7)
MCV RBC AUTO: 88 FL (ref 79–97)
MONOCYTES # BLD MANUAL: 0.7 X10E3/UL (ref 0.1–0.9)
MONOCYTES NFR BLD MANUAL: 15 %
NEUTROPHILS # BLD MANUAL: 2.5 X10E3/UL (ref 1.4–7)
NEUTROPHILS NFR BLD MANUAL: 51 %
PLATELET # BLD AUTO: 250 X10E3/UL (ref 150–379)
PLATELET BLD QL SMEAR: ADEQUATE
POTASSIUM SERPL-SCNC: 4.3 MMOL/L (ref 3.5–5.2)
PROT SERPL-MCNC: 6.2 G/DL (ref 6–8.5)
RBC # BLD AUTO: 4.62 X10E6/UL (ref 3.77–5.28)
RBC MORPH BLD: NORMAL
SODIUM SERPL-SCNC: 140 MMOL/L (ref 134–144)
WBC # BLD AUTO: 4.9 X10E3/UL (ref 3.4–10.8)

## 2017-09-09 RX ORDER — METHOTREXATE 2.5 MG/1
TABLET ORAL
Qty: 78 TAB | Refills: 0 | Status: SHIPPED | OUTPATIENT
Start: 2017-09-09

## 2017-11-06 ENCOUNTER — TELEPHONE (OUTPATIENT)
Dept: RHEUMATOLOGY | Age: 72
End: 2017-11-06

## 2017-11-06 NOTE — TELEPHONE ENCOUNTER
----- Message from Bandar Hammond sent at 11/6/2017  9:06 AM EST -----  Regarding: Dr. Ovidio Erickson  Patient would like a call back regarding a refill for her Methotrex called to [facility], [address]

## 2021-05-11 ENCOUNTER — OFFICE VISIT (OUTPATIENT)
Dept: ORTHOPEDIC SURGERY | Age: 76
End: 2021-05-11
Payer: MEDICARE

## 2021-05-11 VITALS — BODY MASS INDEX: 29.99 KG/M2 | WEIGHT: 180 LBS | HEIGHT: 65 IN

## 2021-05-11 DIAGNOSIS — M25.562 LEFT KNEE PAIN, UNSPECIFIED CHRONICITY: ICD-10-CM

## 2021-05-11 DIAGNOSIS — M17.12 OSTEOARTHRITIS OF LEFT KNEE, UNSPECIFIED OSTEOARTHRITIS TYPE: ICD-10-CM

## 2021-05-11 DIAGNOSIS — M17.12 OSTEOARTHRITIS OF LEFT KNEE, UNSPECIFIED OSTEOARTHRITIS TYPE: Primary | ICD-10-CM

## 2021-05-11 PROCEDURE — G8756 NO BP MEASURE DOC: HCPCS | Performed by: ORTHOPAEDIC SURGERY

## 2021-05-11 PROCEDURE — G8427 DOCREV CUR MEDS BY ELIG CLIN: HCPCS | Performed by: ORTHOPAEDIC SURGERY

## 2021-05-11 PROCEDURE — G8510 SCR DEP NEG, NO PLAN REQD: HCPCS | Performed by: ORTHOPAEDIC SURGERY

## 2021-05-11 PROCEDURE — 1090F PRES/ABSN URINE INCON ASSESS: CPT | Performed by: ORTHOPAEDIC SURGERY

## 2021-05-11 PROCEDURE — G8399 PT W/DXA RESULTS DOCUMENT: HCPCS | Performed by: ORTHOPAEDIC SURGERY

## 2021-05-11 PROCEDURE — 1101F PT FALLS ASSESS-DOCD LE1/YR: CPT | Performed by: ORTHOPAEDIC SURGERY

## 2021-05-11 PROCEDURE — G8419 CALC BMI OUT NRM PARAM NOF/U: HCPCS | Performed by: ORTHOPAEDIC SURGERY

## 2021-05-11 PROCEDURE — 3017F COLORECTAL CA SCREEN DOC REV: CPT | Performed by: ORTHOPAEDIC SURGERY

## 2021-05-11 PROCEDURE — 99203 OFFICE O/P NEW LOW 30 MIN: CPT | Performed by: ORTHOPAEDIC SURGERY

## 2021-05-11 PROCEDURE — G8536 NO DOC ELDER MAL SCRN: HCPCS | Performed by: ORTHOPAEDIC SURGERY

## 2021-05-11 RX ORDER — ATORVASTATIN CALCIUM 10 MG/1
TABLET, FILM COATED ORAL
COMMUNITY
Start: 2021-03-15

## 2021-05-11 RX ORDER — AMLODIPINE BESYLATE 5 MG/1
TABLET ORAL
COMMUNITY
Start: 2021-02-15

## 2021-05-11 RX ORDER — GABAPENTIN 100 MG/1
CAPSULE ORAL
COMMUNITY
Start: 2021-04-03 | End: 2021-06-30 | Stop reason: ALTCHOICE

## 2021-05-11 NOTE — PATIENT INSTRUCTIONS
Knee Pain or Injury: Care Instructions  Your Care Instructions     Injuries are a common cause of knee problems. Sudden (acute) injuries may be caused by a direct blow to the knee. They can also be caused by abnormal twisting, bending, or falling on the knee. Pain, bruising, or swelling may be severe, and may start within minutes of the injury. Overuse is another cause of knee pain. Other causes are climbing stairs, kneeling, and other activities that use the knee. Everyday wear and tear, especially as you get older, also can cause knee pain. Rest, along with home treatment, often relieves pain and allows your knee to heal. If you have a serious knee injury, you may need tests and treatment. Follow-up care is a key part of your treatment and safety. Be sure to make and go to all appointments, and call your doctor if you are having problems. It's also a good idea to know your test results and keep a list of the medicines you take. How can you care for yourself at home? · Be safe with medicines. Read and follow all instructions on the label. ? If the doctor gave you a prescription medicine for pain, take it as prescribed. ? If you are not taking a prescription pain medicine, ask your doctor if you can take an over-the-counter medicine. · Rest and protect your knee. Take a break from any activity that may cause pain. · Put ice or a cold pack on your knee for 10 to 20 minutes at a time. Put a thin cloth between the ice and your skin. · Prop up a sore knee on a pillow when you ice it or anytime you sit or lie down for the next 3 days. Try to keep it above the level of your heart. This will help reduce swelling. · If your knee is not swollen, you can put moist heat, a heating pad, or a warm cloth on your knee. · If your doctor recommends an elastic bandage, sleeve, or other type of support for your knee, wear it as directed.   · Follow your doctor's instructions about how much weight you can put on your leg. Use a cane, crutches, or a walker as instructed. · Follow your doctor's instructions about activity during your healing process. If you can do mild exercise, slowly increase your activity. · Reach and stay at a healthy weight. Extra weight can strain the joints, especially the knees and hips, and make the pain worse. Losing even a few pounds may help. When should you call for help? Call 911 anytime you think you may need emergency care. For example, call if:    · You have symptoms of a blood clot in your lung (called a pulmonary embolism). These may include:  ? Sudden chest pain. ? Trouble breathing. ? Coughing up blood. Call your doctor now or seek immediate medical care if:    · You have severe or increasing pain.     · Your leg or foot turns cold or changes color.     · You cannot stand or put weight on your knee.     · Your knee looks twisted or bent out of shape.     · You cannot move your knee.     · You have signs of infection, such as:  ? Increased pain, swelling, warmth, or redness. ? Red streaks leading from the knee. ? Pus draining from a place on your knee. ? A fever.     · You have signs of a blood clot in your leg (called a deep vein thrombosis), such as:  ? Pain in your calf, back of the knee, thigh, or groin. ? Redness and swelling in your leg or groin. Watch closely for changes in your health, and be sure to contact your doctor if:    · You have tingling, weakness, or numbness in your knee.     · You have any new symptoms, such as swelling.     · You have bruises from a knee injury that last longer than 2 weeks.     · You do not get better as expected. Where can you learn more? Go to http://www.gray.com/  Enter K195 in the search box to learn more about \"Knee Pain or Injury: Care Instructions. \"  Current as of: February 26, 2020               Content Version: 12.8  © 0079-3784 [employer].    Care instructions adapted under license by [employer] (which disclaims liability or warranty for this information). If you have questions about a medical condition or this instruction, always ask your healthcare professional. [address] any warranty or liability for your use of this information.

## 2021-05-11 NOTE — PROGRESS NOTES
Name: Edy Noel    : 1945     Service Dept: [facility]    Patient's Pharmacies:    [address] [address] [facility] [address] [facility] [address]  [facility] [address]  Phone: [phone] Fax: [address]  1350 [facility], [address]  [address] [address]  Phone: [phone] Fax: [phone]       Chief Complaint   Patient presents with    Knee Pain        Visit Vitals  Ht 5' 5\" (1.651 m)   Wt 180 lb (81.6 kg)   BMI 29.95 kg/m²      Allergies   Allergen Reactions    Cephalosporins Unknown (comments)     Pt states she is not allergic to this.  Cyclobenzaprine Other (comments)     dysphoria    Keflex [Cephalexin] Anxiety    Plaquenil [Hydroxychloroquine] Diarrhea    Sulfa (Sulfonamide Antibiotics) Unknown (comments)     Unsure of reaction. Current Outpatient Medications   Medication Sig Dispense Refill    amLODIPine (NORVASC) 5 mg tablet TAKE 1 TABLET BY MOUTH EVERY DAY      atorvastatin (LIPITOR) 10 mg tablet TAKE 1 TABLET BY MOUTH EVERY DAY      gabapentin (NEURONTIN) 100 mg capsule TAKE 1 CAPSULE BY MOUTH 3 TIMES A DAY      methotrexate (RHEUMATREX) 2.5 mg tablet TAKE 6 TABLETS BY MOUTH EVERY WEEK ON THURSDAY 78 Tab 0    folic acid (FOLVITE) 1 mg tablet TAKE 1 TABLET BY MOUTH DAILY 90 Tab 3    predniSONE (DELTASONE) 5 mg tablet 10 mg once daily. Taper by 2.5 mg every 3 days or as directed 30 Tab 0    tiZANidine (ZANAFLEX) 2 mg tablet Take 1 Tab by mouth nightly as needed. Will make drowsy (not for use with any sleep medications) 30 Tab 1    VAGIFEM 10 mcg tab vaginal tablet INSERT 1 T VAGINALLY TWICE WEEKLY FOR 30 DAYS  3    pantoprazole (PROTONIX) 40 mg tablet TK 1 T PO QD  2    modafinil (PROVIGIL) 200 mg tablet Take 200 mg by mouth daily as needed.   1    acetaminophen (TYLENOL) 650 mg CR tablet Take 1,300 mg by mouth daily as needed for Pain.  citalopram (CELEXA) 20 mg tablet Take  by mouth daily.  CALCIUM PHOSPHATE TRIB/VIT D3 (CITRACAL + D PO) Take 630 mg by mouth daily. D3 = 500 IU.  fluticasone (FLONASE) 50 mcg/Actuation nasal spray 1 [place] by Both Nostrils route two (2) times a day.  lisinopril (PRINIVIL, ZESTRIL) 20 mg tablet Take 20 mg by mouth daily.  hydrochlorothiazide (HYDRODIURIL) 25 mg tablet Take 25 mg by mouth daily.  loratadine (CLARITIN) 10 mg tablet Take 10 mg by mouth daily as needed.  cpap machine kit 1 Each by Does Not Apply route.  CPAP @@ 12 cm H2O qhs   Indications: SLEEP APNEA        Patient Active Problem List   Diagnosis Code    Pneumonia, organism unspecified(486) J18.9    HTN (hypertension) I10    Seronegative rheumatoid arthritis of multiple sites ([facility] [address] M06.09    Leukopenia D72.819    Long-term use of immunosuppressant medication Z79.899    Belching symptom R14.2    Carpal tunnel syndrome, left G56.02      Family History   Problem Relation Age of Onset    Osteoporosis Mother     Dementia Mother         alzheimers    Cancer Father         ? where   [de-identified] Diabetes Father     Other Father         \"hardening of the arteries\"   [de-identified] Arthritis-rheumatoid Sister     Arthritis-osteo Sister     Asthma Sister     Stroke Brother         brain aneurysm    Cancer Maternal Grandfather         bone marrow cancer      Social History     Socioeconomic History    Marital status:      Spouse name: Not on file    Number of children: Not on file    Years of education: Not on file    Highest education level: Not on file   Tobacco Use    Smoking status: Former Smoker    Smokeless tobacco: Never Used    Tobacco comment: former cigarette smoker - quit over 30 yrs ago   Substance and Sexual Activity    Alcohol use: Yes     Comment: moderate    Drug use: No    Sexual activity: Yes     Partners: Male      Past Surgical History:   Procedure Laterality Date    COLONOSCOPY      HC BLD CARPEL TUNNEL RELEASE  2003    right    HX APPENDECTOMY      HX CARPAL TUNNEL RELEASE Left 09/2016    HX COLONOSCOPY  March 2013    HX CYST REMOVAL      from right leg - lymphangioma    HX DILATION AND CURETTAGE      HX GYN      right ovary removed    HX ORTHOPAEDIC      left and right shoulder - acromioplasty/rotator cuff repair/bone spur    HX TONSILLECTOMY      HX TUBAL LIGATION      NEUROLOGICAL PROCEDURE UNLISTED      pt states she has not had a neurological procedure    MO BREAST SURGERY PROCEDURE UNLISTED      Biopsy breast (benign)      Past Medical History:   Diagnosis Date    Asthma     Autoimmune disease ([facility] [address]     RA    Benign breast cyst in female     Difficult intubation 1/2000    GERD (gastroesophageal reflux disease)     Hypertension     Lymphedema     right leg    Nausea & vomiting     YOEL on CPAP     Osteoarthritis 07/2009    Other ill-defined conditions(799.89)     wears surgical stocking on right leg    Other ill-defined conditions(799.89)     hx collapsed left lung    Pleurisy     Pneumonia     Psychiatric disorder     depression    Rheumatoid arthritis(714.0) 04/2009    Seasonal allergies     Sleep apnea     uses CPAP        I have reviewed and agree with PFSH and ROS and intake form in chart and the record furthermore I have reviewed prior medical record(s) regarding this patients care during this appointment. Review of Systems:   Patient is a pleasant appearing individual, appropriately dressed, well hydrated, well nourished, who is alert, appropriately oriented for age, and in no acute distress with a normal gait and normal affect who does not appear to be in any significant pain. Physical Exam:  Left knee - Neurovascularly intact with good cap refill, full range of motion and full strength, well healed incision noted, no swelling, no erythema, no instability.      Right knee - Decrease range of motion with flexion, Some crepitation, Grossly neurovascularly intact, Good cap refill, No skin lesion, Moderate swelling, No gross instability, Some quadriceps weakness   Encounter Diagnoses     ICD-10-CM ICD-9-CM   1. Osteoarthritis of left knee, unspecified osteoarthritis type  M17.12 715.96   2. Left knee pain, unspecified chronicity  M25.562 719.46       HPI:  The patient is here with a chief complaint of left knee pain, dull, throbbing pain, progressively getting worse. Pain is 10/10. X-rays are positive for severe OA of the left knee. Assessment/Plan:  Plan will be for left total knee replacement, general medical clearance, outpatient surgery, and we will go from there. As part of continued conservative pain management options the patient was advised to utilize Tylenol or OTC NSAIDS as long as it is not medically contraindicated. Return to Office: Follow-up and Dispositions    · Return for schedule for surgery. Scribed by Amanda Varner LPN as dictated by [facility] [place] SULEMA Malik MD.  Documentation True and Accepted Richard SULEMA Malik MD

## 2021-05-24 ENCOUNTER — HOSPITAL ENCOUNTER (OUTPATIENT)
Dept: NON INVASIVE DIAGNOSTICS | Age: 76
Discharge: HOME | End: 2021-05-24
Payer: MEDICARE

## 2021-05-24 ENCOUNTER — HOSPITAL ENCOUNTER (OUTPATIENT)
Dept: LAB | Age: 76
Discharge: HOME | End: 2021-05-24
Payer: MEDICARE

## 2021-05-24 ENCOUNTER — HOSPITAL ENCOUNTER (OUTPATIENT)
Dept: GENERAL RADIOLOGY | Age: 76
Discharge: HOME | End: 2021-05-24
Payer: MEDICARE

## 2021-05-24 DIAGNOSIS — M25.562 LEFT KNEE PAIN, UNSPECIFIED CHRONICITY: ICD-10-CM

## 2021-05-24 DIAGNOSIS — M17.12 OSTEOARTHRITIS OF LEFT KNEE, UNSPECIFIED OSTEOARTHRITIS TYPE: ICD-10-CM

## 2021-05-24 LAB
ANION GAP SERPL CALC-SCNC: 3 MMOL/L (ref 5–15)
ATRIAL RATE: 64 BPM
BASOPHILS # BLD: 0 K/UL (ref 0–0.1)
BASOPHILS NFR BLD: 1 % (ref 0–1)
BUN SERPL-MCNC: 18 MG/DL (ref 6–20)
BUN/CREAT SERPL: 22 (ref 12–20)
CA-I BLD-MCNC: 9 MG/DL (ref 8.5–10.1)
CALCULATED P AXIS, ECG09: 69 DEGREES
CALCULATED R AXIS, ECG10: 36 DEGREES
CALCULATED T AXIS, ECG11: 70 DEGREES
CHLORIDE SERPL-SCNC: 104 MMOL/L (ref 97–108)
CO2 SERPL-SCNC: 32 MMOL/L (ref 21–32)
CREAT SERPL-MCNC: 0.81 MG/DL (ref 0.55–1.02)
DIAGNOSIS, 93000: NORMAL
DIFFERENTIAL METHOD BLD: ABNORMAL
EOSINOPHIL # BLD: 0.1 K/UL (ref 0–0.4)
EOSINOPHIL NFR BLD: 2 % (ref 0–7)
ERYTHROCYTE [DISTWIDTH] IN BLOOD BY AUTOMATED COUNT: 14.5 % (ref 11.5–14.5)
GLUCOSE SERPL-MCNC: 110 MG/DL (ref 65–100)
HCT VFR BLD AUTO: 43.6 % (ref 35–47)
HGB BLD-MCNC: 13.8 G/DL (ref 11.5–16)
IMM GRANULOCYTES # BLD AUTO: 0 K/UL (ref 0–0.04)
IMM GRANULOCYTES NFR BLD AUTO: 0 % (ref 0–0.5)
LYMPHOCYTES # BLD: 1.5 K/UL (ref 0.8–3.5)
LYMPHOCYTES NFR BLD: 39 % (ref 12–49)
MCH RBC QN AUTO: 28.3 PG (ref 26–34)
MCHC RBC AUTO-ENTMCNC: 31.7 G/DL (ref 30–36.5)
MCV RBC AUTO: 89.3 FL (ref 80–99)
MONOCYTES # BLD: 0.7 K/UL (ref 0–1)
MONOCYTES NFR BLD: 18 % (ref 5–13)
MRSA DNA SPEC QL NAA+PROBE: NOT DETECTED
NEUTS SEG # BLD: 1.5 K/UL (ref 1.8–8)
NEUTS SEG NFR BLD: 40 % (ref 32–75)
NRBC # BLD: 0 K/UL (ref 0–0.01)
NRBC BLD-RTO: 0 PER 100 WBC
P-R INTERVAL, ECG05: 150 MS
PLATELET # BLD AUTO: 221 K/UL (ref 150–400)
PMV BLD AUTO: 10.3 FL (ref 8.9–12.9)
POTASSIUM SERPL-SCNC: 3.6 MMOL/L (ref 3.5–5.1)
Q-T INTERVAL, ECG07: 412 MS
QRS DURATION, ECG06: 86 MS
QTC CALCULATION (BEZET), ECG08: 425 MS
RBC # BLD AUTO: 4.88 M/UL (ref 3.8–5.2)
SODIUM SERPL-SCNC: 139 MMOL/L (ref 136–145)
VENTRICULAR RATE, ECG03: 64 BPM
WBC # BLD AUTO: 3.7 K/UL (ref 3.6–11)

## 2021-05-24 PROCEDURE — 36415 COLL VENOUS BLD VENIPUNCTURE: CPT

## 2021-05-24 PROCEDURE — 93005 ELECTROCARDIOGRAM TRACING: CPT

## 2021-05-24 PROCEDURE — 80048 BASIC METABOLIC PNL TOTAL CA: CPT

## 2021-05-24 PROCEDURE — 85025 COMPLETE CBC W/AUTO DIFF WBC: CPT

## 2021-05-24 PROCEDURE — 71046 X-RAY EXAM CHEST 2 VIEWS: CPT

## 2021-05-24 PROCEDURE — 87641 MR-STAPH DNA AMP PROBE: CPT

## 2021-06-14 ENCOUNTER — ANESTHESIA EVENT (OUTPATIENT)
Dept: SURGERY | Age: 76
End: 2021-06-14
Payer: MEDICARE

## 2021-06-14 RX ORDER — INSULIN LISPRO 100 [IU]/ML
INJECTION, SOLUTION INTRAVENOUS; SUBCUTANEOUS ONCE
Status: CANCELLED | OUTPATIENT
Start: 2021-06-24 | End: 2021-06-24

## 2021-06-17 ENCOUNTER — HOSPITAL ENCOUNTER (OUTPATIENT)
Dept: PREADMISSION TESTING | Age: 76
Discharge: HOME | End: 2021-06-17

## 2021-06-17 ENCOUNTER — OFFICE VISIT (OUTPATIENT)
Dept: ORTHOPEDIC SURGERY | Age: 76
End: 2021-06-17
Payer: MEDICARE

## 2021-06-17 DIAGNOSIS — M17.12 OSTEOARTHRITIS OF LEFT KNEE, UNSPECIFIED OSTEOARTHRITIS TYPE: Primary | ICD-10-CM

## 2021-06-17 DIAGNOSIS — M25.562 LEFT KNEE PAIN, UNSPECIFIED CHRONICITY: ICD-10-CM

## 2021-06-17 PROCEDURE — 3017F COLORECTAL CA SCREEN DOC REV: CPT | Performed by: ORTHOPAEDIC SURGERY

## 2021-06-17 PROCEDURE — 1101F PT FALLS ASSESS-DOCD LE1/YR: CPT | Performed by: ORTHOPAEDIC SURGERY

## 2021-06-17 PROCEDURE — G8427 DOCREV CUR MEDS BY ELIG CLIN: HCPCS | Performed by: ORTHOPAEDIC SURGERY

## 2021-06-17 PROCEDURE — G8399 PT W/DXA RESULTS DOCUMENT: HCPCS | Performed by: ORTHOPAEDIC SURGERY

## 2021-06-17 PROCEDURE — G8419 CALC BMI OUT NRM PARAM NOF/U: HCPCS | Performed by: ORTHOPAEDIC SURGERY

## 2021-06-17 PROCEDURE — 99214 OFFICE O/P EST MOD 30 MIN: CPT | Performed by: ORTHOPAEDIC SURGERY

## 2021-06-17 PROCEDURE — G8432 DEP SCR NOT DOC, RNG: HCPCS | Performed by: ORTHOPAEDIC SURGERY

## 2021-06-17 PROCEDURE — G8536 NO DOC ELDER MAL SCRN: HCPCS | Performed by: ORTHOPAEDIC SURGERY

## 2021-06-17 PROCEDURE — G8756 NO BP MEASURE DOC: HCPCS | Performed by: ORTHOPAEDIC SURGERY

## 2021-06-17 PROCEDURE — 1090F PRES/ABSN URINE INCON ASSESS: CPT | Performed by: ORTHOPAEDIC SURGERY

## 2021-06-17 RX ORDER — CEPHALEXIN 500 MG/1
500 CAPSULE ORAL EVERY 8 HOURS
Qty: 3 CAPSULE | Refills: 0 | Status: CANCELLED | OUTPATIENT
Start: 2021-06-17 | End: 2021-06-18

## 2021-06-17 RX ORDER — ONDANSETRON 4 MG/1
4 TABLET, ORALLY DISINTEGRATING ORAL
Qty: 10 TABLET | Refills: 0 | Status: SHIPPED | OUTPATIENT
Start: 2021-06-17

## 2021-06-17 RX ORDER — CLINDAMYCIN HYDROCHLORIDE 300 MG/1
300 CAPSULE ORAL EVERY 6 HOURS
Qty: 28 CAPSULE | Refills: 0 | Status: SHIPPED | OUTPATIENT
Start: 2021-06-17 | End: 2021-06-24

## 2021-06-17 RX ORDER — OXYCODONE AND ACETAMINOPHEN 5; 325 MG/1; MG/1
1 TABLET ORAL
Qty: 30 TABLET | Refills: 0 | Status: SHIPPED | OUTPATIENT
Start: 2021-06-17 | End: 2021-07-01

## 2021-06-17 NOTE — PATIENT INSTRUCTIONS
Knee Pain or Injury: Care Instructions  Your Care Instructions     Injuries are a common cause of knee problems. Sudden (acute) injuries may be caused by a direct blow to the knee. They can also be caused by abnormal twisting, bending, or falling on the knee. Pain, bruising, or swelling may be severe, and may start within minutes of the injury. Overuse is another cause of knee pain. Other causes are climbing stairs, kneeling, and other activities that use the knee. Everyday wear and tear, especially as you get older, also can cause knee pain. Rest, along with home treatment, often relieves pain and allows your knee to heal. If you have a serious knee injury, you may need tests and treatment. Follow-up care is a key part of your treatment and safety. Be sure to make and go to all appointments, and call your doctor if you are having problems. It's also a good idea to know your test results and keep a list of the medicines you take. How can you care for yourself at home? · Be safe with medicines. Read and follow all instructions on the label. ? If the doctor gave you a prescription medicine for pain, take it as prescribed. ? If you are not taking a prescription pain medicine, ask your doctor if you can take an over-the-counter medicine. · Rest and protect your knee. Take a break from any activity that may cause pain. · Put ice or a cold pack on your knee for 10 to 20 minutes at a time. Put a thin cloth between the ice and your skin. · Prop up a sore knee on a pillow when you ice it or anytime you sit or lie down for the next 3 days. Try to keep it above the level of your heart. This will help reduce swelling. · If your knee is not swollen, you can put moist heat, a heating pad, or a warm cloth on your knee. · If your doctor recommends an elastic bandage, sleeve, or other type of support for your knee, wear it as directed.   · Follow your doctor's instructions about how much weight you can put on your leg. Use a cane, crutches, or a walker as instructed. · Follow your doctor's instructions about activity during your healing process. If you can do mild exercise, slowly increase your activity. · Reach and stay at a healthy weight. Extra weight can strain the joints, especially the knees and hips, and make the pain worse. Losing even a few pounds may help. When should you call for help? Call 911 anytime you think you may need emergency care. For example, call if:    · You have symptoms of a blood clot in your lung (called a pulmonary embolism). These may include:  ? Sudden chest pain. ? Trouble breathing. ? Coughing up blood. Call your doctor now or seek immediate medical care if:    · You have severe or increasing pain.     · Your leg or foot turns cold or changes color.     · You cannot stand or put weight on your knee.     · Your knee looks twisted or bent out of shape.     · You cannot move your knee.     · You have signs of infection, such as:  ? Increased pain, swelling, warmth, or redness. ? Red streaks leading from the knee. ? Pus draining from a place on your knee. ? A fever.     · You have signs of a blood clot in your leg (called a deep vein thrombosis), such as:  ? Pain in your calf, back of the knee, thigh, or groin. ? Redness and swelling in your leg or groin. Watch closely for changes in your health, and be sure to contact your doctor if:    · You have tingling, weakness, or numbness in your knee.     · You have any new symptoms, such as swelling.     · You have bruises from a knee injury that last longer than 2 weeks.     · You do not get better as expected. Where can you learn more? Go to http://www.gray.com/  Enter K195 in the search box to learn more about \"Knee Pain or Injury: Care Instructions. \"  Current as of: February 26, 2020               Content Version: 12.8  © 7755-8048 [employer].    Care instructions adapted under license by [employer] (which disclaims liability or warranty for this information). If you have questions about a medical condition or this instruction, always ask your healthcare professional. [address] any warranty or liability for your use of this information.

## 2021-06-17 NOTE — H&P (VIEW-ONLY)
Name: Preet Pozo    : 1945     Service Dept: 414 [facility] and [facility] Medicine    Patient's Pharmacies:    [address] [facility] , [address]  [facility], [address]  [facility] [address]  Phone: [phone] Fax: [address] [facility], [address]  [address]  Ramin Restrepo [facility] [address]  Phone: [phone] Fax: [phone]    [facility]   [place], [place] [facility] [address]  [address] [facility] [place] [address]  Phone: [phone] Fax: [phone]       Chief Complaint   Patient presents with    Knee Pain    Pre-op Exam        There were no vitals taken for this visit. Allergies   Allergen Reactions    Cephalosporins Unknown (comments)     Pt states she is not allergic to this.  Cyclobenzaprine Other (comments)     dysphoria    Keflex [Cephalexin] Anxiety    Plaquenil [Hydroxychloroquine] Diarrhea    Sulfa (Sulfonamide Antibiotics) Unknown (comments)     Unsure of reaction. Current Outpatient Medications   Medication Sig Dispense Refill    amLODIPine (NORVASC) 5 mg tablet TAKE 1 TABLET BY MOUTH EVERY DAY      atorvastatin (LIPITOR) 10 mg tablet TAKE 1 TABLET BY MOUTH EVERY DAY      gabapentin (NEURONTIN) 100 mg capsule TAKE 1 CAPSULE BY MOUTH 3 TIMES A DAY (Patient not taking: Reported on 2021)      methotrexate (RHEUMATREX) 2.5 mg tablet TAKE 6 TABLETS BY MOUTH EVERY WEEK ON THURSDAY 78 Tab 0    folic acid (FOLVITE) 1 mg tablet TAKE 1 TABLET BY MOUTH DAILY 90 Tab 3    predniSONE (DELTASONE) 5 mg tablet 10 mg once daily. Taper by 2.5 mg every 3 days or as directed (Patient not taking: Reported on 2021) 30 Tab 0    tiZANidine (ZANAFLEX) 2 mg tablet Take 1 Tab by mouth nightly as needed.  Will make drowsy (not for use with any sleep medications) (Patient not taking: Reported on 2021) 30 Tab 1    VAGIFEM 10 mcg tab vaginal tablet INSERT 1 T VAGINALLY TWICE WEEKLY FOR 30 DAYS (Patient not taking: Reported on 6/14/2021)  3    pantoprazole (PROTONIX) 40 mg tablet TK 1 T PO QD  2    modafinil (PROVIGIL) 200 mg tablet Take 200 mg by mouth daily as needed. (Patient not taking: Reported on 6/14/2021)  1    acetaminophen (TYLENOL) 650 mg CR tablet Take 1,300 mg by mouth daily as needed for Pain.  citalopram (CELEXA) 20 mg tablet Take  by mouth daily.  CALCIUM PHOSPHATE TRIB/VIT D3 (CITRACAL + D PO) Take 630 mg by mouth daily. D3 = 500 IU.  fluticasone (FLONASE) 50 mcg/Actuation nasal spray 1 [place] by Both Nostrils route two (2) times a day. (Patient not taking: Reported on 6/14/2021)      lisinopril (PRINIVIL, ZESTRIL) 20 mg tablet Take 20 mg by mouth daily.  hydrochlorothiazide (HYDRODIURIL) 25 mg tablet Take 25 mg by mouth daily.  loratadine (CLARITIN) 10 mg tablet Take 10 mg by mouth daily as needed.  cpap machine kit 1 Each by Does Not Apply route.  CPAP @@ 12 cm H2O qhs   Indications: SLEEP APNEA        Patient Active Problem List   Diagnosis Code    Pneumonia, organism unspecified(486) J18.9    HTN (hypertension) I10    Seronegative rheumatoid [facility] of multiple sites [facility] M06.09    Leukopenia D72.819    Long-term use of immunosuppressant medication Z79.899    Belching symptom R14.2    Carpal tunnel syndrome, left G56.02      Family History   Problem Relation Age of Onset    Osteoporosis Mother     Dementia Mother         alzheimers    Cancer Father         ? where   [place] Diabetes Father     Other Father         \"hardening of the arteries\"   [place] Arthritis-rheumatoid Sister     Arthritis-osteo Sister     Asthma Sister     Stroke Brother         brain aneurysm    Cancer Maternal Grandfather         bone marrow cancer      Social History     Socioeconomic History    Marital status:      Spouse name: Not on file    Number of children: Not on file  Years of education: Not on file    Highest education level: Not on file   Tobacco Use    Smoking status: Former Smoker    Smokeless tobacco: Never Used    Tobacco comment: former cigarette smoker - quit over 30 yrs ago   Substance and Sexual Activity    Alcohol use: Yes     Comment: moderate    Drug use: No    Sexual activity: Yes     Partners: Male     Social Determinants of Health     Financial Resource Strain:     Difficulty of Paying Living Expenses:    Food Insecurity:     Worried About Running Out of Food in the Last Year:     Ran Out of Food in the Last Year:    Transportation Needs:     Lack of Transportation (Medical):      Lack of Transportation (Non-Medical):    Physical Activity:     Days of Exercise per Week:     Minutes of Exercise per Session:    Stress:     Feeling of Stress :    Social Connections:     Frequency of Communication with Friends and Family:     Frequency of Social Gatherings with Friends and Family:     Attends Sabianist Services:     Active Member of Clubs or Organizations:     Attends Club or Organization Meetings:     Marital Status:       Past Surgical History:   Procedure Laterality Date    COLONOSCOPY      HC BLD CARPEL TUNNEL RELEASE  2003    right    HX APPENDECTOMY      HX CARPAL TUNNEL RELEASE Left 09/2016    HX COLONOSCOPY  March 2013    HX CYST REMOVAL      from right leg - lymphangioma    HX DILATION AND CURETTAGE      HX GYN      right ovary removed    HX ORTHOPAEDIC      left and right shoulder - acromioplasty/rotator cuff repair/bone spur    HX TONSILLECTOMY      HX TUBAL LIGATION      NEUROLOGICAL PROCEDURE UNLISTED      pt states she has not had a neurological procedure    ID BREAST SURGERY PROCEDURE UNLISTED      Biopsy breast (benign)      Past Medical History:   Diagnosis Date    Asthma     Autoimmune disease ([facility]     RA    Benign breast cyst in female     Difficult intubation 1/2000    GERD (gastroesophageal reflux disease)     Hypertension     Lymphedema     right leg    Nausea & vomiting     YOEL on CPAP     Osteoarthritis 07/2009    Other ill-defined conditions(799.89)     wears surgical stocking on right leg    Other ill-defined conditions(799.89)     hx collapsed left lung    Pleurisy     Pneumonia     Psychiatric disorder     depression    Rheumatoid arthritis(714.0) 04/2009    Seasonal allergies     Sleep apnea     uses CPAP        I have reviewed and agree with [facility] and ROS and intake form in chart and the record furthermore I have reviewed prior medical record(s) regarding this patients care during this appointment. Review of Systems:   Patient is a pleasant appearing individual, appropriately dressed, well hydrated, well nourished, who is alert, appropriately oriented for age, and in no acute distress with a normal gait and normal affect who does not appear to be in any significant pain. Physical Exam:  Left knee - Neurovascularly intact with good cap refill, full range of motion and full strength, well healed incision noted, no swelling, no erythema, no instability. Right knee - Decrease range of motion with flexion, Some crepitation, Grossly neurovascularly intact, Good cap refill, No skin lesion, Moderate swelling, No gross instability, Some quadriceps weakness    Inpatient status: The patient has admitted to severe pain in the affected knee and due to such pain they are unable to complete activities of daily living at home and/or work on a regular basis where conservative treatments have failed. After extensive discussion with the patient, they have chosen to receive a total knee replacement with the expectation of inpatient procedure. Their dependent functional status (i.e. lack of capable support and safety at home, pain management, comorbities, or difficulty ambulating with assistive walking devices) would deem them a candidate for an inpatient stay.  The patient acknowledges and understand the plan. The risks of surgery were explained to the patient which include but not limited to infection, nerve injury, artery injury, tendon injury, poor result, poor wound healing, unforeseen incidence, bleeding, infection, nerve damage, failure to improve, worsening of symptoms, morbidity, and mortality risks were explained. All questions were answered. Patient was told of no guarantees. Patient accepts all risks and benefits. A consent for surgery will be documented and signed by the patient or a legal guardian. All questions were answered. The procedure was explained in detail. The patient was counseled about the risks of silvia Covid-19 during their perioperative period and any recovery window from their procedure. The patient was made aware that silvia Covid-19 may worsen their prognosis for recovering from their procedure and lend to a higher morbidity and/or mortality risk. All material risks, benefits, and reasonable alternatives including postponing the procedure were discussed. The patient DOES wish to proceed with their procedure at this time. Encounter Diagnoses     ICD-10-CM ICD-9-CM   1. Osteoarthritis of left knee, unspecified osteoarthritis type  M17.12 715.96   2. Left knee pain, unspecified chronicity  M25.562 719.46       HPI:  The patient is here with a chief complaint of left knee pain, dull, throbbing pain. It is a lot better, but continues to have difficulty at times. Failed conservative treatment. Pain is 5/10. Assessment/Plan:  Plan will be for left total knee replacement, and she is cleared. Percocet, Keflex, Zofran and aspirin for DVT prophylaxis. As part of continued conservative pain management options the patient was advised to utilize Tylenol or OTC NSAIDS as long as it is not medically contraindicated. Return to Office: Follow-up and Dispositions    · Return for already scheduled for surgery.            Scribed by Sena Saldana LPN as dictated by Triny Bonilla. Katie Hampton MD.  Documentation True and Accepted Richard Hampton MD

## 2021-06-17 NOTE — PROGRESS NOTES
Name: Tasneem Nair    : 1945     Service Dept: 414 [facility] and Sports Medicine    Patient's Pharmacies:    [address] [facility] [address]  [facility], [address] [facility] [facility] [address]  [facility] [address]  Phone: [phone] Fax: [address] [facility] [address], [address]  [address]  [place] [address] [address] [address]  Phone: [phone] Fax: [phone]    [facility]   [place], [place] [facility]  [address] [facility] [address]  Phone: [phone] Fax: [phone]       Chief Complaint   Patient presents with    Knee Pain    Pre-op Exam        There were no vitals taken for this visit. Allergies   Allergen Reactions    Cephalosporins Unknown (comments)     Pt states she is not allergic to this.  Cyclobenzaprine Other (comments)     dysphoria    Keflex [Cephalexin] Anxiety    Plaquenil [Hydroxychloroquine] Diarrhea    Sulfa (Sulfonamide Antibiotics) Unknown (comments)     Unsure of reaction. Current Outpatient Medications   Medication Sig Dispense Refill    amLODIPine (NORVASC) 5 mg tablet TAKE 1 TABLET BY MOUTH EVERY DAY      atorvastatin (LIPITOR) 10 mg tablet TAKE 1 TABLET BY MOUTH EVERY DAY      gabapentin (NEURONTIN) 100 mg capsule TAKE 1 CAPSULE BY MOUTH 3 TIMES A DAY (Patient not taking: Reported on 2021)      methotrexate (RHEUMATREX) 2.5 mg tablet TAKE 6 TABLETS BY MOUTH EVERY WEEK ON THURSDAY 78 Tab 0    folic acid (FOLVITE) 1 mg tablet TAKE 1 TABLET BY MOUTH DAILY 90 Tab 3    predniSONE (DELTASONE) 5 mg tablet 10 mg once daily. Taper by 2.5 mg every 3 days or as directed (Patient not taking: Reported on 2021) 30 Tab 0    tiZANidine (ZANAFLEX) 2 mg tablet Take 1 Tab by mouth nightly as needed.  Will make drowsy (not for use with any sleep medications) (Patient not taking: Reported on 2021) 30 Tab 1    VAGIFEM 10 mcg tab vaginal tablet INSERT 1 T VAGINALLY TWICE WEEKLY FOR 30 DAYS (Patient not taking: Reported on 6/14/2021)  3    pantoprazole (PROTONIX) 40 mg tablet TK 1 T PO QD  2    modafinil (PROVIGIL) 200 mg tablet Take 200 mg by mouth daily as needed. (Patient not taking: Reported on 6/14/2021)  1    acetaminophen (TYLENOL) 650 mg CR tablet Take 1,300 mg by mouth daily as needed for Pain.  citalopram (CELEXA) 20 mg tablet Take  by mouth daily.  CALCIUM PHOSPHATE TRIB/VIT D3 (CITRACAL + D PO) Take 630 mg by mouth daily. D3 = 500 IU.  fluticasone (FLONASE) 50 mcg/Actuation nasal spray 1 [place] by Both Nostrils route two (2) times a day. (Patient not taking: Reported on 6/14/2021)      lisinopril (PRINIVIL, ZESTRIL) 20 mg tablet Take 20 mg by mouth daily.  hydrochlorothiazide (HYDRODIURIL) 25 mg tablet Take 25 mg by mouth daily.  loratadine (CLARITIN) 10 mg tablet Take 10 mg by mouth daily as needed.  cpap machine kit 1 Each by Does Not Apply route.  CPAP @@ 12 cm H2O qhs   Indications: SLEEP APNEA        Patient Active Problem List   Diagnosis Code    Pneumonia, organism unspecified(486) J18.9    HTN (hypertension) I10    Seronegative rheumatoid arthritis of multiple sites ([facility] M06.09    Leukopenia D72.819    Long-term use of immunosuppressant medication Z79.899    Belching symptom R14.2    Carpal tunnel syndrome, left G56.02      Family History   Problem Relation Age of Onset    Osteoporosis Mother     Dementia Mother         alzheimers    Cancer Father         ? where   [place]ianna Olszewski Diabetes Father     Other Father         \"hardening of the arteries\"   Georgianna Olszewski Arthritis-rheumatoid Sister     Arthritis-osteo Sister     Asthma Sister     Stroke Brother         brain aneurysm    Cancer Maternal Grandfather         bone marrow cancer      Social History     Socioeconomic History    Marital status:      Spouse name: Not on file    Number of children: Not on file  Years of education: Not on file    Highest education level: Not on file   Tobacco Use    Smoking status: Former Smoker    Smokeless tobacco: Never Used    Tobacco comment: former cigarette smoker - quit over 30 yrs ago   Substance and Sexual Activity    Alcohol use: Yes     Comment: moderate    Drug use: No    Sexual activity: Yes     Partners: Male     Social Determinants of Health     Financial Resource Strain:     Difficulty of Paying Living Expenses:    Food Insecurity:     Worried About Running Out of Food in the Last Year:     Ran Out of Food in the Last Year:    Transportation Needs:     Lack of Transportation (Medical):      Lack of Transportation (Non-Medical):    Physical Activity:     Days of Exercise per Week:     Minutes of Exercise per Session:    Stress:     Feeling of Stress :    Social Connections:     Frequency of Communication with Friends and Family:     Frequency of Social Gatherings with Friends and Family:     Attends Hindu Services:     Active Member of Clubs or Organizations:     Attends Club or Organization Meetings:     Marital Status:       Past Surgical History:   Procedure Laterality Date    COLONOSCOPY      HC BLD CARPEL TUNNEL RELEASE  2003    right    HX APPENDECTOMY      HX CARPAL TUNNEL RELEASE Left 09/2016    HX COLONOSCOPY  March 2013    HX CYST REMOVAL      from right leg - lymphangioma    HX DILATION AND CURETTAGE      HX GYN      right ovary removed    HX ORTHOPAEDIC      left and right shoulder - acromioplasty/rotator cuff repair/bone spur    HX TONSILLECTOMY      HX TUBAL LIGATION      NEUROLOGICAL PROCEDURE UNLISTED      pt states she has not had a neurological procedure    CO BREAST SURGERY PROCEDURE UNLISTED      Biopsy breast (benign)      Past Medical History:   Diagnosis Date    Asthma     Autoimmune disease ([facility]     RA    Benign breast cyst in female     Difficult intubation 1/2000    GERD (gastroesophageal reflux disease)     Hypertension     Lymphedema     right leg    Nausea & vomiting     YOEL on CPAP     Osteoarthritis 07/2009    Other ill-defined conditions(799.89)     wears surgical stocking on right leg    Other ill-defined conditions(799.89)     hx collapsed left lung    Pleurisy     Pneumonia     Psychiatric disorder     depression    Rheumatoid arthritis(714.0) 04/2009    Seasonal allergies     Sleep apnea     uses CPAP        I have reviewed and agree with [facility] and ROS and intake form in chart and the record furthermore I have reviewed prior medical record(s) regarding this patients care during this appointment. Review of Systems:   Patient is a pleasant appearing individual, appropriately dressed, well hydrated, well nourished, who is alert, appropriately oriented for age, and in no acute distress with a normal gait and normal affect who does not appear to be in any significant pain. Physical Exam:  Left knee - Neurovascularly intact with good cap refill, full range of motion and full strength, well healed incision noted, no swelling, no erythema, no instability. Right knee - Decrease range of motion with flexion, Some crepitation, Grossly neurovascularly intact, Good cap refill, No skin lesion, Moderate swelling, No gross instability, Some quadriceps weakness    Inpatient status: The patient has admitted to severe pain in the affected knee and due to such pain they are unable to complete activities of daily living at home and/or work on a regular basis where conservative treatments have failed. After extensive discussion with the patient, they have chosen to receive a total knee replacement with the expectation of inpatient procedure. Their dependent functional status (i.e. lack of capable support and safety at home, pain management, comorbities, or difficulty ambulating with assistive walking devices) would deem them a candidate for an inpatient stay.  The patient acknowledges and understand the plan. The risks of surgery were explained to the patient which include but not limited to infection, nerve injury, artery injury, tendon injury, poor result, poor wound healing, unforeseen incidence, bleeding, infection, nerve damage, failure to improve, worsening of symptoms, morbidity, and mortality risks were explained. All questions were answered. Patient was told of no guarantees. Patient accepts all risks and benefits. A consent for surgery will be documented and signed by the patient or a legal guardian. All questions were answered. The procedure was explained in detail. The patient was counseled about the risks of silvia Covid-19 during their perioperative period and any recovery window from their procedure. The patient was made aware that silvia Covid-19 may worsen their prognosis for recovering from their procedure and lend to a higher morbidity and/or mortality risk. All material risks, benefits, and reasonable alternatives including postponing the procedure were discussed. The patient DOES wish to proceed with their procedure at this time. Encounter Diagnoses     ICD-10-CM ICD-9-CM   1. Osteoarthritis of left knee, unspecified osteoarthritis type  M17.12 715.96   2. Left knee pain, unspecified chronicity  M25.562 719.46       HPI:  The patient is here with a chief complaint of left knee pain, dull, throbbing pain. It is a lot better, but continues to have difficulty at times. Failed conservative treatment. Pain is 5/10. Assessment/Plan:  Plan will be for left total knee replacement, and she is cleared. Percocet, Keflex, Zofran and aspirin for DVT prophylaxis. As part of continued conservative pain management options the patient was advised to utilize Tylenol or OTC NSAIDS as long as it is not medically contraindicated. Return to Office: Follow-up and Dispositions    · Return for already scheduled for surgery.            Scribed by Sabra Ly LPN as dictated by Joshua Bonner. Vishnu Rayo MD.  Documentation True and Accepted Richard Rayo MD

## 2021-06-24 ENCOUNTER — APPOINTMENT (OUTPATIENT)
Dept: GENERAL RADIOLOGY | Age: 76
End: 2021-06-24
Attending: ORTHOPAEDIC SURGERY
Payer: MEDICARE

## 2021-06-24 ENCOUNTER — ANESTHESIA (OUTPATIENT)
Dept: SURGERY | Age: 76
End: 2021-06-24
Payer: MEDICARE

## 2021-06-24 ENCOUNTER — HOSPITAL ENCOUNTER (OUTPATIENT)
Age: 76
Discharge: HOME | End: 2021-06-24
Attending: ORTHOPAEDIC SURGERY | Admitting: ORTHOPAEDIC SURGERY
Payer: MEDICARE

## 2021-06-24 VITALS
SYSTOLIC BLOOD PRESSURE: 122 MMHG | TEMPERATURE: 98.1 F | WEIGHT: 179 LBS | HEIGHT: 65 IN | HEART RATE: 68 BPM | DIASTOLIC BLOOD PRESSURE: 78 MMHG | OXYGEN SATURATION: 98 % | RESPIRATION RATE: 17 BRPM | BODY MASS INDEX: 29.82 KG/M2

## 2021-06-24 PROBLEM — M17.9 KNEE OSTEOARTHRITIS: Status: ACTIVE | Noted: 2021-06-24

## 2021-06-24 LAB
ABO + RH BLD: NORMAL
BLOOD GROUP ANTIBODIES SERPL: NEGATIVE
SPECIMEN EXP DATE BLD: NORMAL

## 2021-06-24 PROCEDURE — 74011000258 HC RX REV CODE- 258: Performed by: NURSE ANESTHETIST, CERTIFIED REGISTERED

## 2021-06-24 PROCEDURE — 99218 HC RM OBSERVATION: CPT

## 2021-06-24 PROCEDURE — 77030006812 HC BLD SAW RECIP STRY -B: Performed by: ORTHOPAEDIC SURGERY

## 2021-06-24 PROCEDURE — 77030010783 HC BOWL MX BN CEM J&J -B: Performed by: ORTHOPAEDIC SURGERY

## 2021-06-24 PROCEDURE — 77030040361 HC SLV COMPR DVT MDII -B: Performed by: ORTHOPAEDIC SURGERY

## 2021-06-24 PROCEDURE — 77030013079 HC BLNKT BAIR HGGR 3M -A: Performed by: NURSE ANESTHETIST, CERTIFIED REGISTERED

## 2021-06-24 PROCEDURE — 76010000131 HC OR TIME 2 TO 2.5 HR: Performed by: ORTHOPAEDIC SURGERY

## 2021-06-24 PROCEDURE — 77030006835 HC BLD SAW SAG STRY -B: Performed by: ORTHOPAEDIC SURGERY

## 2021-06-24 PROCEDURE — 97116 GAIT TRAINING THERAPY: CPT

## 2021-06-24 PROCEDURE — 74011250636 HC RX REV CODE- 250/636: Performed by: ORTHOPAEDIC SURGERY

## 2021-06-24 PROCEDURE — 77030002933 HC SUT MCRYL J&J -A: Performed by: ORTHOPAEDIC SURGERY

## 2021-06-24 PROCEDURE — 77030040393 HC DRSG OPTIFOAM GENT MDII -B: Performed by: ORTHOPAEDIC SURGERY

## 2021-06-24 PROCEDURE — 86901 BLOOD TYPING SEROLOGIC RH(D): CPT

## 2021-06-24 PROCEDURE — 74011250636 HC RX REV CODE- 250/636: Performed by: NURSE ANESTHETIST, CERTIFIED REGISTERED

## 2021-06-24 PROCEDURE — 74011000250 HC RX REV CODE- 250: Performed by: ORTHOPAEDIC SURGERY

## 2021-06-24 PROCEDURE — C1713 ANCHOR/SCREW BN/BN,TIS/BN: HCPCS | Performed by: ORTHOPAEDIC SURGERY

## 2021-06-24 PROCEDURE — 76210000063 HC OR PH I REC FIRST 0.5 HR: Performed by: ORTHOPAEDIC SURGERY

## 2021-06-24 PROCEDURE — 77030002982 HC SUT POLYSRB J&J -A: Performed by: ORTHOPAEDIC SURGERY

## 2021-06-24 PROCEDURE — 74011000250 HC RX REV CODE- 250: Performed by: NURSE ANESTHETIST, CERTIFIED REGISTERED

## 2021-06-24 PROCEDURE — 74011250637 HC RX REV CODE- 250/637: Performed by: NURSE ANESTHETIST, CERTIFIED REGISTERED

## 2021-06-24 PROCEDURE — 77030041690 HC SYS PINNING KN JNJ -D: Performed by: ORTHOPAEDIC SURGERY

## 2021-06-24 PROCEDURE — 97161 PT EVAL LOW COMPLEX 20 MIN: CPT

## 2021-06-24 PROCEDURE — 64450 NJX AA&/STRD OTHER PN/BRANCH: CPT | Performed by: NURSE ANESTHETIST, CERTIFIED REGISTERED

## 2021-06-24 PROCEDURE — 77030007866 HC KT SPN ANES BBMI -B: Performed by: NURSE ANESTHETIST, CERTIFIED REGISTERED

## 2021-06-24 PROCEDURE — 74011250637 HC RX REV CODE- 250/637: Performed by: ORTHOPAEDIC SURGERY

## 2021-06-24 PROCEDURE — 77030011266 HC ELECTRD BLD INSL COVD -A: Performed by: ORTHOPAEDIC SURGERY

## 2021-06-24 PROCEDURE — C1776 JOINT DEVICE (IMPLANTABLE): HCPCS | Performed by: ORTHOPAEDIC SURGERY

## 2021-06-24 PROCEDURE — 76942 ECHO GUIDE FOR BIOPSY: CPT | Performed by: NURSE ANESTHETIST, CERTIFIED REGISTERED

## 2021-06-24 PROCEDURE — 2709999900 HC NON-CHARGEABLE SUPPLY: Performed by: ORTHOPAEDIC SURGERY

## 2021-06-24 PROCEDURE — 77030029372 HC ADH SKN CLSR PRINEO J&J -C: Performed by: ORTHOPAEDIC SURGERY

## 2021-06-24 PROCEDURE — 73560 X-RAY EXAM OF KNEE 1 OR 2: CPT

## 2021-06-24 PROCEDURE — 76060000035 HC ANESTHESIA 2 TO 2.5 HR: Performed by: ORTHOPAEDIC SURGERY

## 2021-06-24 PROCEDURE — 77030000032 HC CUF TRNQT ZIMM -B: Performed by: ORTHOPAEDIC SURGERY

## 2021-06-24 PROCEDURE — 74011000272 HC RX REV CODE- 272: Performed by: ORTHOPAEDIC SURGERY

## 2021-06-24 PROCEDURE — 77030018673: Performed by: ORTHOPAEDIC SURGERY

## 2021-06-24 PROCEDURE — 77030031139 HC SUT VCRL2 J&J -A: Performed by: ORTHOPAEDIC SURGERY

## 2021-06-24 PROCEDURE — 77030003601 HC NDL NRV BLK BBMI -A: Performed by: NURSE ANESTHETIST, CERTIFIED REGISTERED

## 2021-06-24 PROCEDURE — 77030013708 HC HNDPC SUC IRR PULS STRY –B: Performed by: ORTHOPAEDIC SURGERY

## 2021-06-24 DEVICE — COMPONENT FEM SZ 7 L KNEE POST STBL CEM ATTUNE: Type: IMPLANTABLE DEVICE | Site: KNEE | Status: FUNCTIONAL

## 2021-06-24 DEVICE — KNEE K1 TOT HEMI STD CEM IMPL CAPPED SYNTHES: Type: IMPLANTABLE DEVICE | Site: KNEE | Status: FUNCTIONAL

## 2021-06-24 DEVICE — COMPONENT TIB SZ 6 CEM BASE ROT PLATFRM KNEE SYS ATTUNE: Type: IMPLANTABLE DEVICE | Site: KNEE | Status: FUNCTIONAL

## 2021-06-24 DEVICE — COMPONENT PAT DIA35MM KNEE POLY DOME CEM MEDIALIZED ATTUNE: Type: IMPLANTABLE DEVICE | Site: KNEE | Status: FUNCTIONAL

## 2021-06-24 DEVICE — INSERT TIB SZ 7 THK5MM KNEE POST STBL ROT PLATFRM ATTUNE: Type: IMPLANTABLE DEVICE | Site: KNEE | Status: FUNCTIONAL

## 2021-06-24 DEVICE — CEMENT BNE 40GM FULL DOSE PMMA W/ GENT HI VISC RADPQ LNG: Type: IMPLANTABLE DEVICE | Site: KNEE | Status: FUNCTIONAL

## 2021-06-24 RX ORDER — SODIUM CHLORIDE 0.9 % (FLUSH) 0.9 %
5-40 SYRINGE (ML) INJECTION AS NEEDED
Status: DISCONTINUED | OUTPATIENT
Start: 2021-06-24 | End: 2021-06-24 | Stop reason: HOSPADM

## 2021-06-24 RX ORDER — SODIUM CHLORIDE, SODIUM LACTATE, POTASSIUM CHLORIDE, CALCIUM CHLORIDE 600; 310; 30; 20 MG/100ML; MG/100ML; MG/100ML; MG/100ML
25 INJECTION, SOLUTION INTRAVENOUS CONTINUOUS
Status: DISCONTINUED | OUTPATIENT
Start: 2021-06-24 | End: 2021-06-24 | Stop reason: HOSPADM

## 2021-06-24 RX ORDER — FENTANYL CITRATE 50 UG/ML
50 INJECTION, SOLUTION INTRAMUSCULAR; INTRAVENOUS AS NEEDED
Status: DISCONTINUED | OUTPATIENT
Start: 2021-06-24 | End: 2021-06-24 | Stop reason: HOSPADM

## 2021-06-24 RX ORDER — OXYCODONE AND ACETAMINOPHEN 5; 325 MG/1; MG/1
2 TABLET ORAL
Status: DISCONTINUED | OUTPATIENT
Start: 2021-06-24 | End: 2021-06-24 | Stop reason: HOSPADM

## 2021-06-24 RX ORDER — GABAPENTIN 300 MG/1
300 CAPSULE ORAL ONCE
Status: COMPLETED | OUTPATIENT
Start: 2021-06-24 | End: 2021-06-24

## 2021-06-24 RX ORDER — SODIUM CHLORIDE 0.9 % (FLUSH) 0.9 %
5-40 SYRINGE (ML) INJECTION EVERY 8 HOURS
Status: DISCONTINUED | OUTPATIENT
Start: 2021-06-24 | End: 2021-06-24 | Stop reason: HOSPADM

## 2021-06-24 RX ORDER — FACIAL-BODY WIPES
10 EACH TOPICAL DAILY PRN
Status: DISCONTINUED | OUTPATIENT
Start: 2021-06-24 | End: 2021-06-24 | Stop reason: HOSPADM

## 2021-06-24 RX ORDER — BUPIVACAINE HYDROCHLORIDE 7.5 MG/ML
INJECTION, SOLUTION EPIDURAL; RETROBULBAR
Status: SHIPPED | OUTPATIENT
Start: 2021-06-24 | End: 2021-06-24

## 2021-06-24 RX ORDER — DIPHENHYDRAMINE HYDROCHLORIDE 50 MG/ML
12.5 INJECTION, SOLUTION INTRAMUSCULAR; INTRAVENOUS
Status: DISCONTINUED | OUTPATIENT
Start: 2021-06-24 | End: 2021-06-24 | Stop reason: HOSPADM

## 2021-06-24 RX ORDER — DEXAMETHASONE SODIUM PHOSPHATE 4 MG/ML
INJECTION, SOLUTION INTRA-ARTICULAR; INTRALESIONAL; INTRAMUSCULAR; INTRAVENOUS; SOFT TISSUE
Status: SHIPPED | OUTPATIENT
Start: 2021-06-24 | End: 2021-06-24

## 2021-06-24 RX ORDER — SENNOSIDES 8.6 MG/1
1 TABLET ORAL 2 TIMES DAILY
Status: DISCONTINUED | OUTPATIENT
Start: 2021-06-24 | End: 2021-06-24 | Stop reason: HOSPADM

## 2021-06-24 RX ORDER — LABETALOL HCL 20 MG/4 ML
SYRINGE (ML) INTRAVENOUS AS NEEDED
Status: DISCONTINUED | OUTPATIENT
Start: 2021-06-24 | End: 2021-06-24 | Stop reason: HOSPADM

## 2021-06-24 RX ORDER — ACETAMINOPHEN 325 MG/1
650 TABLET ORAL
Status: DISCONTINUED | OUTPATIENT
Start: 2021-06-24 | End: 2021-06-24 | Stop reason: HOSPADM

## 2021-06-24 RX ORDER — ONDANSETRON 2 MG/ML
4 INJECTION INTRAMUSCULAR; INTRAVENOUS ONCE
Status: DISCONTINUED | OUTPATIENT
Start: 2021-06-24 | End: 2021-06-24 | Stop reason: HOSPADM

## 2021-06-24 RX ORDER — MAGNESIUM SULFATE 100 %
4 CRYSTALS MISCELLANEOUS AS NEEDED
Status: CANCELLED | OUTPATIENT
Start: 2021-06-24

## 2021-06-24 RX ORDER — MIDAZOLAM HYDROCHLORIDE 1 MG/ML
INJECTION, SOLUTION INTRAMUSCULAR; INTRAVENOUS
Status: SHIPPED | OUTPATIENT
Start: 2021-06-24 | End: 2021-06-24

## 2021-06-24 RX ORDER — ONDANSETRON 2 MG/ML
INJECTION INTRAMUSCULAR; INTRAVENOUS AS NEEDED
Status: DISCONTINUED | OUTPATIENT
Start: 2021-06-24 | End: 2021-06-24 | Stop reason: HOSPADM

## 2021-06-24 RX ORDER — BUPIVACAINE HYDROCHLORIDE 2.5 MG/ML
INJECTION, SOLUTION EPIDURAL; INFILTRATION; INTRACAUDAL AS NEEDED
Status: DISCONTINUED | OUTPATIENT
Start: 2021-06-24 | End: 2021-06-24 | Stop reason: HOSPADM

## 2021-06-24 RX ORDER — ACETAMINOPHEN 500 MG
1000 TABLET ORAL ONCE
Status: COMPLETED | OUTPATIENT
Start: 2021-06-24 | End: 2021-06-24

## 2021-06-24 RX ORDER — PROPOFOL 10 MG/ML
VIAL (ML) INTRAVENOUS
Status: DISCONTINUED | OUTPATIENT
Start: 2021-06-24 | End: 2021-06-24 | Stop reason: HOSPADM

## 2021-06-24 RX ORDER — ALBUTEROL SULFATE 0.83 MG/ML
2.5 SOLUTION RESPIRATORY (INHALATION)
Status: DISCONTINUED | OUTPATIENT
Start: 2021-06-24 | End: 2021-06-24 | Stop reason: HOSPADM

## 2021-06-24 RX ORDER — CLINDAMYCIN PHOSPHATE 900 MG/50ML
900 INJECTION INTRAVENOUS ONCE
Status: COMPLETED | OUTPATIENT
Start: 2021-06-24 | End: 2021-06-24

## 2021-06-24 RX ORDER — FENTANYL CITRATE 50 UG/ML
50 INJECTION, SOLUTION INTRAMUSCULAR; INTRAVENOUS
Status: DISCONTINUED | OUTPATIENT
Start: 2021-06-24 | End: 2021-06-24 | Stop reason: HOSPADM

## 2021-06-24 RX ORDER — DEXTROSE 50 % IN WATER (D50W) INTRAVENOUS SYRINGE
25-50 AS NEEDED
Status: CANCELLED | OUTPATIENT
Start: 2021-06-24

## 2021-06-24 RX ORDER — FLUMAZENIL 0.1 MG/ML
0.2 INJECTION INTRAVENOUS
Status: DISCONTINUED | OUTPATIENT
Start: 2021-06-24 | End: 2021-06-24 | Stop reason: HOSPADM

## 2021-06-24 RX ORDER — ONDANSETRON 2 MG/ML
4 INJECTION INTRAMUSCULAR; INTRAVENOUS
Status: DISCONTINUED | OUTPATIENT
Start: 2021-06-24 | End: 2021-06-24 | Stop reason: HOSPADM

## 2021-06-24 RX ORDER — ASPIRIN 325 MG
325 TABLET, DELAYED RELEASE (ENTERIC COATED) ORAL 2 TIMES DAILY
Status: DISCONTINUED | OUTPATIENT
Start: 2021-06-25 | End: 2021-06-24 | Stop reason: HOSPADM

## 2021-06-24 RX ORDER — NALOXONE HYDROCHLORIDE 0.4 MG/ML
0.4 INJECTION, SOLUTION INTRAMUSCULAR; INTRAVENOUS; SUBCUTANEOUS AS NEEDED
Status: DISCONTINUED | OUTPATIENT
Start: 2021-06-24 | End: 2021-06-24 | Stop reason: HOSPADM

## 2021-06-24 RX ORDER — NALOXONE HYDROCHLORIDE 0.4 MG/ML
0.2 INJECTION, SOLUTION INTRAMUSCULAR; INTRAVENOUS; SUBCUTANEOUS AS NEEDED
Status: DISCONTINUED | OUTPATIENT
Start: 2021-06-24 | End: 2021-06-24 | Stop reason: HOSPADM

## 2021-06-24 RX ORDER — OXYCODONE AND ACETAMINOPHEN 10; 325 MG/1; MG/1
1 TABLET ORAL
Status: DISCONTINUED | OUTPATIENT
Start: 2021-06-24 | End: 2021-06-24 | Stop reason: HOSPADM

## 2021-06-24 RX ORDER — CLINDAMYCIN PHOSPHATE 900 MG/50ML
900 INJECTION INTRAVENOUS EVERY 8 HOURS
Status: DISCONTINUED | OUTPATIENT
Start: 2021-06-24 | End: 2021-06-24 | Stop reason: HOSPADM

## 2021-06-24 RX ORDER — BUPIVACAINE HYDROCHLORIDE 5 MG/ML
INJECTION, SOLUTION EPIDURAL; INTRACAUDAL
Status: SHIPPED | OUTPATIENT
Start: 2021-06-24 | End: 2021-06-24

## 2021-06-24 RX ADMIN — LABETALOL HYDROCHLORIDE 10 MG: 5 INJECTION, SOLUTION INTRAVENOUS at 13:29

## 2021-06-24 RX ADMIN — GABAPENTIN 300 MG: 300 CAPSULE ORAL at 10:27

## 2021-06-24 RX ADMIN — MIDAZOLAM 2 MG: 1 INJECTION INTRAMUSCULAR; INTRAVENOUS at 13:11

## 2021-06-24 RX ADMIN — ACETAMINOPHEN 1000 MG: 500 TABLET ORAL at 10:27

## 2021-06-24 RX ADMIN — SODIUM CHLORIDE, POTASSIUM CHLORIDE, SODIUM LACTATE AND CALCIUM CHLORIDE 25 ML/HR: 600; 310; 30; 20 INJECTION, SOLUTION INTRAVENOUS at 10:30

## 2021-06-24 RX ADMIN — BUPIVACAINE HYDROCHLORIDE 25 ML: 5 INJECTION, SOLUTION EPIDURAL; INTRACAUDAL; PERINEURAL at 12:18

## 2021-06-24 RX ADMIN — PROPOFOL 50 MCG/KG/MIN: 10 INJECTION, EMULSION INTRAVENOUS at 13:10

## 2021-06-24 RX ADMIN — TRANEXAMIC ACID 1 G: 1 INJECTION, SOLUTION INTRAVENOUS at 13:03

## 2021-06-24 RX ADMIN — TRANEXAMIC ACID 1 G: 1 INJECTION, SOLUTION INTRAVENOUS at 13:32

## 2021-06-24 RX ADMIN — ONDANSETRON HYDROCHLORIDE 4 MG: 2 INJECTION, SOLUTION INTRAMUSCULAR; INTRAVENOUS at 13:12

## 2021-06-24 RX ADMIN — BUPIVACAINE HYDROCHLORIDE 12 MG: 7.5 INJECTION, SOLUTION EPIDURAL; RETROBULBAR at 13:05

## 2021-06-24 RX ADMIN — DEXAMETHASONE SODIUM PHOSPHATE 4 MG: 4 INJECTION, SOLUTION INTRAMUSCULAR; INTRAVENOUS at 12:18

## 2021-06-24 RX ADMIN — MIDAZOLAM 2 MG: 1 INJECTION INTRAMUSCULAR; INTRAVENOUS at 12:18

## 2021-06-24 RX ADMIN — OXYCODONE HYDROCHLORIDE AND ACETAMINOPHEN 2 TABLET: 5; 325 TABLET ORAL at 18:09

## 2021-06-24 RX ADMIN — CLINDAMYCIN IN 5 PERCENT DEXTROSE 900 MG: 18 INJECTION, SOLUTION INTRAVENOUS at 12:53

## 2021-06-24 NOTE — INTERVAL H&P NOTE
Update History & Physical    The Patient's History and Physical was reviewed with the patient. There was no change. The surgical site was confirmed by the patient and me. Plan:  The risk, benefits, expected outcome, and alternative to the recommended procedure have been discussed with the patient. Patient understands and wants to proceed with the procedure.       Electronically signed by Leslie Childs MD on 6/24/2021 at 10:22 AM

## 2021-06-24 NOTE — ANESTHESIA PREPROCEDURE EVALUATION
Relevant Problems   CARDIOVASCULAR   (+) HTN (hypertension)      ENDOCRINE   (+) Seronegative rheumatoid arthritis of multiple sites [facility])       Anesthetic History     Increased risk of difficult airway and PONV          Review of Systems / Medical History      Pulmonary        Sleep apnea: CPAP    Asthma        Neuro/Psych         Psychiatric history     Cardiovascular    Hypertension: well controlled                   GI/Hepatic/Renal     GERD           Endo/Other        Arthritis     Other Findings              Physical Exam    Airway  Mallampati: III  TM Distance: 4 - 6 cm  Neck ROM: normal range of motion   Mouth opening: Normal     Cardiovascular  Regular rate and rhythm,  S1 and S2 normal,  no murmur, click, rub, or gallop  Rhythm: regular  Rate: normal         Dental  No notable dental hx       Pulmonary  Breath sounds clear to auscultation               Abdominal  GI exam deferred       Other Findings            Anesthetic Plan    ASA: 3  Anesthesia type: regional, spinal and general - backup - saphenous block      Post-op pain plan if not by surgeon: peripheral nerve block single    Induction: Intravenous  Anesthetic plan and risks discussed with: Patient

## 2021-06-24 NOTE — ANESTHESIA PROCEDURE NOTES
Peripheral Block    Start time: 6/24/2021 12:12 PM  End time: 6/24/2021 12:18 PM  Performed by: Apollo Espinoza CRNA  Authorized by: Apollo Espinoza CRNA       Pre-procedure: Indications: at surgeon's request and post-op pain management    Preanesthetic Checklist: patient identified, risks and benefits discussed, site marked, timeout performed, anesthesia consent given and patient being monitored    Timeout Time: 12:12 EDT          Block Type:   Block Type:   Adductor canal block  Laterality:  Left  Monitoring:  Standard ASA monitoring, responsive to questions, oxygen, continuous pulse ox, frequent vital sign checks and heart rate  Injection Technique:  Single shot  Procedures: ultrasound guided    Patient Position: supine  Prep: chlorhexidine    Location:  Mid thigh  Needle Type:  Ultraplex  Needle Gauge:  20 G  Needle Localization:  Ultrasound guidance  Medication Injected:  Midazolam (VERSED) injection, 2 mg  bupivacaine (PF) (MARCAINE) 0.5% injection, 25 mL  dexamethasone (DECADRON) 4 mg/mL injection, 4 mg  Med Admin Time: 6/24/2021 12:18 PM    Assessment:  Number of attempts:  1  Injection Assessment:  Incremental injection every 5 mL, local visualized surrounding nerve on ultrasound, no paresthesia, negative aspiration for blood, no intravascular symptoms and ultrasound image on chart  Patient tolerance:  Patient tolerated the procedure well with no immediate complications

## 2021-06-24 NOTE — PERIOP NOTES
Attempted to call  for Surgeon to talk to about wife's surgery. Attempted x3. Went to . No message left. Surgeon notified and aware.

## 2021-06-24 NOTE — ANESTHESIA PROCEDURE NOTES
Spinal Block    Start time: 6/24/2021 12:53 PM  End time: 6/24/2021 1:05 PM  Performed by: Christophe Bojorquez CRNA  Authorized by: Christophe Bojorquez CRNA     Pre-procedure:   Indications: at surgeon's request and primary anesthetic  Preanesthetic Checklist: patient identified, risks and benefits discussed, anesthesia consent, site marked, patient being monitored and timeout performed    Timeout Time: 12:53 EDT          Spinal Block:   Patient Position:  Seated  Prep Region:  Lumbar  Prep: Betadine      Location:  L3-4  Technique:  Single shot    Local Dose (mL):  1.4    Needle:   Needle Type:  Pencan  Needle Gauge:  22 G  Attempts:  1      Events: CSF confirmed, no blood with aspiration and no paresthesia        Assessment:  Insertion:  Uncomplicated  Patient tolerance:  Patient tolerated the procedure well with no immediate complications

## 2021-06-24 NOTE — PROGRESS NOTES
Problem: Mobility Impaired (Adult and Pediatric)  Goal: *Acute Goals and Plan of Care (Insert Text)  Description: Pt requires CGA with gait and navigates stairs with CGA. PLOF: Community ambulator who used a cane PRN and who was (I) With ADLs. Outcome: Resolved/Met     Problem: Patient Education: Go to Patient Education Activity  Goal: Patient/Family Education  Outcome: Resolved/Met   PHYSICAL THERAPY EVALUATION AND DISCHARGE    Patient: Evelin Oscar (89 y.o. female)  Date: 6/24/2021  Primary Diagnosis: Osteoarthritis of left knee, unspecified osteoarthritis type [M17.12]  Knee osteoarthritis [M17.10]  Procedure(s) (LRB):  LEFT TKA (Left) Day of Surgery   Precautions:  WBAT    ASSESSMENT :  Based on the objective data described below, the patient presents s/p L TKA and she is WBAT. She is able to ambulate with a RW with CGA and she is able to navigate stairs with CGA. She is educated on a HEP and tolerates well. She can return home with outpatient P.T. Patient does not require further skilled intervention at this level of care. PLAN :  Recommendations and Planned Interventions:   No formal PT needs identified at this time. Discharge Recommendations: Outpatient  Further Equipment Recommendations for Discharge: N/A     SUBJECTIVE:   Patient states i'm ready to walk.     OBJECTIVE DATA SUMMARY:     Past Medical History:   Diagnosis Date    Asthma     Autoimmune disease ([facility] [address]     RA    Benign breast cyst in female     Difficult intubation 1/2000    GERD (gastroesophageal reflux disease)     Hypertension     Lymphedema     right leg    Nausea & vomiting     YOEL on CPAP     Osteoarthritis 07/2009    Other ill-defined conditions(799.89)     wears surgical stocking on right leg    Other ill-defined conditions(799.89)     hx collapsed left lung    Pleurisy     Pneumonia     Psychiatric disorder     depression    Rheumatoid arthritis(714.0) 04/2009    Seasonal allergies     Sleep apnea     uses CPAP     Past Surgical History:   Procedure Laterality Date    COLONOSCOPY      HC BLD CARPEL TUNNEL RELEASE  2003    right    HX APPENDECTOMY      HX CARPAL TUNNEL RELEASE Left 09/2016    HX COLONOSCOPY  March 2013    HX CYST REMOVAL      from right leg - lymphangioma    HX DILATION AND CURETTAGE      HX GYN      right ovary removed    HX ORTHOPAEDIC      left and right shoulder - acromioplasty/rotator cuff repair/bone spur    HX TONSILLECTOMY      HX TUBAL LIGATION      NEUROLOGICAL PROCEDURE UNLISTED      pt states she has not had a neurological procedure    IL BREAST SURGERY PROCEDURE UNLISTED      Biopsy breast (benign)     Barriers to Learning/Limitations: None  Compensate with: N/A  Home Situation:   Home Situation  Home Environment: Private residence  # Steps to Enter: 4  Rails to Enter: Yes  Hand Rails : Bilateral  One/Two Story Residence: Two story, live on 1st floor  # of Interior Steps: 13  Interior Rails: Both  Lift Chair Available: No  Living Alone: No  Support Systems: Spouse/Significant Other/Partner  Patient Expects to be Discharged to[de-identified] [employer]  Current DME Used/Available at Home: Walker, rolling, Cane, straight  Critical Behavior:  Neurologic State: Alert  Orientation Level: Oriented X4  Strength:    Strength: Generally decreased, functional (L knee 4/5, SLR 1600, 3 hours after spinal)  Tone & Sensation:   Sensation: Impaired (Buttocks and upper thighs still numb)  Range Of Motion:   AROM: Generally decreased, functional (L knee 8-70)  PROM: Generally decreased, functional (L knee 6-76)  Posture:  Posture (WDL): Within defined limits      Functional Mobility:  Bed Mobility:  Rolling: Modified independent  Supine to Sit: Modified independent  Sit to Supine: Modified independent  Scooting: Modified independent  Transfers:  Sit to Stand: Modified independent  Stand to Sit: Modified independent  Balance:   Sitting: Intact  Standing: Impaired  Standing - Static: Good  Standing - Dynamic : Good;Fair  Ambulation/Gait Training:  Distance (ft): 570 Feet (ft) (and another 140')  Assistive Device: Walker, rolling  Ambulation - Level of Assistance: Contact guard assistance     Gait Description (WDL): Exceptions to WDL  Gait Abnormalities: Path deviations     Left Side Weight Bearing: As tolerated  Stairs:  Number of Stairs Trained: 4 (unable to reciprocate)  Stairs - Level of Assistance: Contact guard assistance  Rail Use: Both     Today's TX:   Pt is able to ambulate with CGA with a Rw. She is able to navigate stairs with CGA. She is educated on a HEP and states understanding. Pain:  Pain level pre-treatment: 3-4/10   Pain level post-treatment: 6/10  Pain Location: L knee  Pain Intervention(s): Medication (see MAR); Rest, Ice, Repositioning   Response to intervention: Nurse notified, See doc flow    Activity Tolerance:   Good  Please refer to the flowsheet for vital signs taken during this treatment. After treatment:   []         Patient left in no apparent distress sitting up in chair  [x]         Patient left in no apparent distress in bed  [x]         Call bell left within reach  [x]         Nursing notified  []         Caregiver present  []         Bed alarm activated  []         SCDs applied    COMMUNICATION/EDUCATION:   [x]         Role of Physical Therapy in the acute care setting. []         Fall prevention education was provided and the patient/caregiver indicated understanding. [x]         Patient/family have participated as able in goal setting and plan of care. [x]         Patient/family agree to work toward stated goals and plan of care. []         Patient understands intent and goals of therapy, but is neutral about his/her participation. []         Patient is unable to participate in goal setting/plan of care: ongoing with therapy staff.  []         Other:     Thank you for this referral.  Chelle Sweeney, PT, DPT   Time Calculation: 31 mins

## 2021-06-24 NOTE — PROGRESS NOTES
32 61 16- assumed care of pt to room 242 s/p left TKA,  at bedside. Patient is beginning to restore feeling back to bilat legs and states both legs are tingling, denies pain at the moment. Oriented to room and CB system VSS  1600- pt and  both provided food/drink, pt tolerating clear liquids well  1809- pain medication given prior to DC, pt lives in [facility] and has a long car ride. Pt rates pain 6/10 Given per [facility]  1912- pt states her knee is still sore, she is ambulating well though. Extensive DC instructions given to patient and  who both verbalize understanding. Pt and her  are very pleasant and verbalized appreciation for the care they received during her stay. Pt discharged in stable condition with  at side. No distress noted.

## 2021-06-24 NOTE — OP NOTES
Operative Note    Patient: Shamika Oliva MRN: [id number]  Surgery Date: 6/24/2021  [unfilled]          Procedure  Primary Surgeon    LEFT TKA  Carlos Nolan MD    * Panel 2 does not exist *  * Panel 2 does not exist *    * Panel 3 does not exist *  * Panel 3 does not exist *     Surgeon(s) and Role:     * Carlos Nolan MD - Primary    Other OR Staff/Assistants:  Circ-1: Tova Ramos  Circ-Raisa Cameron  Scrub Tech-1: Dora Barnett  Surg Asst-1: Elvia Jamison  Surg Asst-2: Monserrat harrington Assistant Tasks:  Closing    Pre-operative Diagnosis:  Osteoarthritis of left knee, unspecified osteoarthritis type [M17.12]    Post-operative Diagnosis: same as preop diagnosis    Anesthesia Type: Spinal     Findings: djd    Complications: No    EBL: 50 cc    Specimens: None    Implants     Implant    Cement Bne 40gm Full Dose Pmma W/ Gent Hi Visc Radpq Lng - [id number] - Implanted   (Left) Knee    Inventory item: CEMENT BNE 40GM FULL DOSE PMMA W/ GENT HI VISC RADPQ LNG Model/Cat number: [id number]    : [employer] Lot number: [id number]    As of 6/24/2021     Status: Implanted                         OPERATIVE PROCEDURE:  Please note the first assistant role was to help in patient positioning and draping of the extremity in a sterile fashion. Also during the surgery the assistant's responsibilities included but not limited to extremity positioning during critical portions of the surgery. Assisting in using and placement of retractors during surgery. Lower extremity was prepped and draped in a sterile fashion. After adequate anesthesia was given, the patient was placed in a well-padded supine position. Subvastus arthrotomy from the tibial tubercle to the superior pole of the patella was made. Knee was hyperflexed. Intramedullary reaming of distal femur and proximal tibia was performed. 10 mm of distal femur was cut. Anterior-posterior sizing guide was used. Anterior, posterior, chamfer cuts, and box cuts were made next. Proximal tibial cut and preparation performed. Posterior osteophyte meniscal remnants were removed, and also patella was everted. Free-hand cut of the patella was made. Trial components were placed. The patient was found to have excellent range of motion and stability with all trial components. All the trial components removed. Copious irrigation performed. Distal femur, proximal tibia, and patella were impacted in place. Excessive cement was removed. After the cement was hard, Subvastus arthrotomy closed with Vicryl and Prineo stitch. Compressive dressing was applied. The patient was taken to PACU in stable condition.       Karson Sal MD

## 2021-06-24 NOTE — DISCHARGE INSTRUCTIONS
TOTAL KNEE REPLACEMENT DISCHARGE INFORMATION    You have undergone a Total Knee Replacement. The following list is to provide you with some expectations over the next week upon your discharge from the hospital.     1. Please continue your Aspirin 325mg every 12 hours (twice daily) or any other blood thinner starting tomorrow as directed until Dr. Francisca Begum instructs you to discontinue it. If you are not sure which blood thinner to take please contact Dr. Quispe Child office next business day for clarification. 2. Please be sure to continue your thigh-high compression stockings on both sides until instructed to discontinue them. 3. Over the course of the next week, you should continue thigh high stockings on the operative leg, DO NOT GET THE INCISION WET until instructed to do so. Please make sure the stockings on the operative leg are pulled up all the way to the thigh to prevent any creases which may result in abrasions or creases in the skin. If the stockings are creating creases resulting in abrasions or blistering on the operative leg please remove the stockings. You may take the stockings off on the nonoperative leg once you arrive home. 4. You may notice some bruising on your thigh and it may extend all the way to the ankles. That is perfectly normal early on.  5. You may experience a clicking noise in your knee and that is normal because of the artificial knee. 6. It is important to remember if you have any surgical procedure including dental procedures which may result in bleeding that an antibiotic 1 hour before the procedure will be required. Please let the provider performing the procedure know that you have artificial joint. If an antibiotic is not given by them please call our office and give us at least 5 business days to get you the appropriate antibiotics if needed. This rule applies indefinitely.   7. If an Ace wrap is placed on your knee you may remove the Ace wrap only 48 hours after your surgery. We will leave the stockings on.  8. During the course of your  over the next week, should you experience fevers of 101.5 F, a white drainage from the incision, extreme redness around the incision, or the incision begins to have a pungent smell; Please call our office or page Dr. Raiza Breen whose numbers are provided in your discharge paperwork. To Page Dr. Raiza Breen please call [phone] and dial [phone]. Have the  page whomever is on call for Orthopedics. These are signs of infection and it should be addressed immediately. 9. Please do not drive until instructed to do so. 10. If you need a refill on pain medication please allow at least 2 business days notice for any refills. Immediate refill request may not be possible. Medication refill requests will not be addressed during non-business hours. Please do not page the on-call provider for pain medication refills after hours. 11. It is very important for you to begin your Outpatient Physical Therapy within a couple days of the day of your discharge and your appointment should have been set up. If your physical therapy has not been set up please call our office the next business day for assistance. Details provided in a separate sheet. 12. Remove ace wrap in 48 hrs after surgery but keep stockings on. 15. Finish all antibiotics, start the antibiotics as soon as you go home if you have prescribed antibiotics. 14.  You should perform your daily home exercises at least 4 times a day 30 minutes each time. Perform foot pumps on both feet at least 10 times every 15 minutes while awake. This helps prevent swelling in the leg and can help prevent blood clots in the leg. On the operative leg if you have significant swelling you can also lay down flat and put 3 pillows under the heel so the heel is above the heart level and then perform foot pumps 4 times a day for 10 minutes to help bring the swelling down.   15.  Do not place anything under your knee while sleeping at night. Elevate your heel so your  is straight while sleeping at night. 16.  Perform deep breathing exercises 10 times every hour while awake. 17.  If you had a nerve block and you are not having pain the day of the surgery, at nighttime it is okay to take 1 pain medication before going to sleep to help prevent excruciating pain when the nerve block wears off. 18.  You may be given an ice pack machine use that to help prevent swelling. Do not apply heat to the incision area. 19.  While you are awake at least 10 times every 30 minutes move your foot up and down as if you are pumping gas from both feet to help prevent swelling and to promote blood circulation in the calf. 20.  If you develop sudden onset of shortness of breath or severe calf pain please go to closest emergency room. 21. Your pain medicine is a Narcotic and may cause constipation. You may take an over the counter stool softener while taking pain medicine. ICE THERAPY WRAP:    · Keep ice therapy wrap on when resting. · DO not wear when moving or walking. · Ice packs are reusable. · Ice Therapy wrap holds two ice packs at a time. Things to watch for:             Increased swelling of the surgical site             Spreading of redness around the incision site             Drainage of pus from the incision site             Developing a fever of 101.5 °F or higher             If any of these symptoms occur you have any questions please contact our office at [phone]. If you need to talk to Dr. Christian Botello on an urgent basis please call the hospital at [phone]. 0 for the . Please let the  know you are a surgical patient of Dr. Christian Botello and you wish to get in contact with him. If Dr. Christian Botello or his staff do not call you back within 30 minutes. Please tell the  to try again. Phone: [phone]  www. [email or web address]

## 2021-06-24 NOTE — ANESTHESIA POSTPROCEDURE EVALUATION
Procedure(s):  LEFT TKA. regional, spinal    Anesthesia Post Evaluation      Multimodal analgesia: multimodal analgesia used between 6 hours prior to anesthesia start to PACU discharge  Patient location during evaluation: bedside  Patient participation: complete - patient cannot participate  Level of consciousness: awake and alert  Pain management: adequate  Airway patency: patent  Anesthetic complications: no  Cardiovascular status: stable  Respiratory status: acceptable  Hydration status: acceptable  Comments: DC when criteria met.   Post anesthesia nausea and vomiting:  none  Final Post Anesthesia Temperature Assessment:  Normothermia (36.0-37.5 degrees C)      INITIAL Post-op Vital signs:   Vitals Value Taken Time   /74 06/24/21 1451   Temp 36.5 °C (97.7 °F) 06/24/21 1451   Pulse 66 06/24/21 1451   Resp 17 06/24/21 1451   SpO2

## 2021-06-24 NOTE — PERIOP NOTES
TRANSFER - OUT REPORT:    Verbal report given to TOM Dasilva LPN on [address]  being transferred to [facility] for routine post - op       Report consisted of patients Situation, Background, Assessment and   Recommendations(SBAR). Information from the following report(s) SBAR, Procedure Summary, Intake/Output and MAR was reviewed with the receiving nurse. Lines:   Peripheral IV 06/24/21 Posterior;Right Hand (Active)   Site Assessment Clean, dry, & intact 06/24/21 1456   Phlebitis Assessment 0 06/24/21 1456   Infiltration Assessment 0 06/24/21 1456   Dressing Status Clean, dry, & intact 06/24/21 1456   Dressing Type Transparent 06/24/21 1456   Hub Color/Line Status Pink; Infusing 06/24/21 1456   Alcohol Cap Used No 06/24/21 1028        Opportunity for questions and clarification was provided. Patient transported with:   Registered NurseDENISE RN  Visit Vitals  /66 (BP 1 Location: Left upper arm, BP Patient Position: At rest)   Pulse 66   Temp 97.8 °F (36.6 °C)   Resp 19   Ht 5' 5\" (1.651 m)   Wt 81.2 kg (179 lb)   SpO2 94%   BMI 29.79 kg/m²

## 2021-06-25 NOTE — PROGRESS NOTES
Nurse was reviewing discharge instructions with patient and patient did not know about IS or receive one at discharge. Patient educated to turn, deep breath and cough every two hours. Patient verbalized understanding.

## 2021-06-30 ENCOUNTER — OFFICE VISIT (OUTPATIENT)
Dept: ORTHOPEDIC SURGERY | Age: 76
End: 2021-06-30
Payer: MEDICARE

## 2021-06-30 DIAGNOSIS — M17.12 OSTEOARTHRITIS OF LEFT KNEE, UNSPECIFIED OSTEOARTHRITIS TYPE: Primary | ICD-10-CM

## 2021-06-30 PROCEDURE — 99024 POSTOP FOLLOW-UP VISIT: CPT | Performed by: ORTHOPAEDIC SURGERY

## 2021-06-30 NOTE — PROGRESS NOTES
Name: Ijeoma Romero    : 1945     Service Dept: [address] [place] [address] and Sports Medicine    Patient's Pharmacies:    [place] [address] [place], [place] - \[facility]\""  [facility]   \[facility]\""  . [address] [id number]  Phone: [phone] Fax: [phone]       No chief complaint on file. There were no vitals taken for this visit. Allergies   Allergen Reactions    Cephalosporins Unknown (comments)     Pt states she is not allergic to this.  Cyclobenzaprine Other (comments)     dysphoria    Keflex [Cephalexin] Anxiety    Plaquenil [Hydroxychloroquine] Diarrhea    Sulfa (Sulfonamide Antibiotics) Unknown (comments)     Unsure of reaction. Current Outpatient Medications   Medication Sig Dispense Refill    oxyCODONE-acetaminophen (Percocet) 5-325 mg per tablet Take 1 Tablet by mouth every four to six (4-6) hours as needed for Pain for up to 14 days. Max Daily Amount: 6 Tablets. 30 Tablet 0    ondansetron (ZOFRAN ODT) 4 mg disintegrating tablet Take 1 Tablet by mouth every eight (8) hours as needed for Nausea or Nausea or Vomiting. 10 Tablet 0    amLODIPine (NORVASC) 5 mg tablet TAKE 1 TABLET BY MOUTH EVERY DAY      atorvastatin (LIPITOR) 10 mg tablet TAKE 1 TABLET BY MOUTH EVERY DAY      methotrexate (RHEUMATREX) 2.5 mg tablet TAKE 6 TABLETS BY MOUTH EVERY WEEK ON THURSDAY 78 Tab 0    folic acid (FOLVITE) 1 mg tablet TAKE 1 TABLET BY MOUTH DAILY 90 Tab 3    pantoprazole (PROTONIX) 40 mg tablet TK 1 T PO QD  2    acetaminophen (TYLENOL) 650 mg CR tablet Take 1,300 mg by mouth daily as needed for Pain.  citalopram (CELEXA) 20 mg tablet Take  by mouth daily.  CALCIUM PHOSPHATE TRIB/VIT D3 (CITRACAL + D PO) Take 630 mg by mouth daily. D3 = 500 IU.  lisinopril (PRINIVIL, ZESTRIL) 20 mg tablet Take 20 mg by mouth daily.  hydrochlorothiazide (HYDRODIURIL) 25 mg tablet Take 25 mg by mouth daily.       loratadine (CLARITIN) 10 mg tablet Take 10 mg by mouth daily as needed.  cpap machine kit 1 Each by Does Not Apply route. CPAP @@ 12 cm H2O qhs   Indications: SLEEP APNEA        Patient Active Problem List   Diagnosis Code    Pneumonia, organism unspecified(486) J18.9    HTN (hypertension) I10    Seronegative rheumatoid arthritis of multiple sites ([facility] [address] M06.09    Leukopenia D72.819    Long-term use of immunosuppressant medication Z79.899    Belching symptom R14.2    Carpal tunnel syndrome, left G56.02    Knee osteoarthritis M17.10      Family History   Problem Relation Age of Onset    Osteoporosis Mother     Dementia Mother         alzheimers    Cancer Father         ? where   Wilhelminia Blazing Diabetes Father     Other Father         \"hardening of the arteries\"   Wilhelminia Blazing Arthritis-rheumatoid Sister     Arthritis-osteo Sister     Asthma Sister     Stroke Brother         brain aneurysm    Cancer Maternal Grandfather         bone marrow cancer      Social History     Socioeconomic History    Marital status:      Spouse name: Not on file    Number of children: Not on file    Years of education: Not on file    Highest education level: Not on file   Tobacco Use    Smoking status: Former Smoker    Smokeless tobacco: Never Used    Tobacco comment: former cigarette smoker - quit over 30 yrs ago   Substance and Sexual Activity    Alcohol use: Yes     Comment: moderate    Drug use: No    Sexual activity: Yes     Partners: Male     Social Determinants of Health     Financial Resource Strain:     Difficulty of Paying Living Expenses:    Food Insecurity:     Worried About Running Out of Food in the Last Year:     [address] in the Last Year:    Transportation Needs:     Lack of Transportation (Medical):      Lack of Transportation (Non-Medical):    Physical Activity:     Days of Exercise per Week:     Minutes of Exercise per Session:    Stress:     Feeling of Stress :    Social Connections:     Frequency of Communication with Friends and Family:  Frequency of Social Gatherings with Friends and Family:     Attends Yarsanism Services:     Active Member of Clubs or Organizations:     Attends Club or Organization Meetings:     Marital Status:       Past Surgical History:   Procedure Laterality Date    COLONOSCOPY      HC BLD CARPEL TUNNEL RELEASE  2003    right    HX APPENDECTOMY      HX CARPAL TUNNEL RELEASE Left 09/2016    HX COLONOSCOPY  March 2013    HX CYST REMOVAL      from right leg - lymphangioma    HX DILATION AND CURETTAGE      HX GYN      right ovary removed    HX ORTHOPAEDIC      left and right shoulder - acromioplasty/rotator cuff repair/bone spur    HX TONSILLECTOMY      HX TUBAL LIGATION      NEUROLOGICAL PROCEDURE UNLISTED      pt states she has not had a neurological procedure    PA BREAST SURGERY PROCEDURE UNLISTED      Biopsy breast (benign)      Past Medical History:   Diagnosis Date    Asthma     Autoimmune disease ([facility] [address]     RA    Benign breast cyst in female     Difficult intubation 1/2000    GERD (gastroesophageal reflux disease)     Hypertension     Lymphedema     right leg    Nausea & vomiting     YOEL on CPAP     Osteoarthritis 07/2009    Other ill-defined conditions(799.89)     wears surgical stocking on right leg    Other ill-defined conditions(799.89)     hx collapsed left lung    Pleurisy     Pneumonia     Psychiatric disorder     depression    Rheumatoid arthritis(714.0) 04/2009    Seasonal allergies     Sleep apnea     uses CPAP        I have reviewed and agree with [address] [place] [address] Nw and ROS and intake form in chart and the record furthermore I have reviewed prior medical record(s) regarding this patients care during this appointment.      Review of Systems:   Patient is a pleasant appearing individual, appropriately dressed, well hydrated, well nourished, who is alert, appropriately oriented for age, and in no acute distress with a normal gait and normal affect who does not appear to be in any significant pain.   Physical Exam:  Left knee - Neurovascularly intact with good cap refill, full range of motion and full strength, well healed incision noted, no swelling, no erythema, no instability. Right knee - Decrease range of motion with flexion, Some crepitation, Grossly neurovascularly intact, Good cap refill, No skin lesion, Moderate swelling, No gross instability, Some quadriceps weakness   Encounter Diagnoses     ICD-10-CM ICD-9-CM   1. Osteoarthritis of left knee, unspecified osteoarthritis type  M17.12 715.96       HPI:  The patient is here status post left knee replacement. Surgery was on 6/24/2021. Incision is clean, dry and intact. Assessment/Plan:  Plan at this point, activities as tolerated, weightbearing started, yearly appointment. If she gets worse, she is to give me a call. As part of continued conservative pain management options the patient was advised to utilize Tylenol or OTC NSAIDS as long as it is not medically contraindicated. Return to Office: Follow-up and Dispositions    · Return in about 1 year (around 6/30/2022). Scribed by Thaddeus Abrams LPN as dictated by [facility] Gross MD.  Documentation True and Accepted Richard Gross MD

## 2021-06-30 NOTE — PATIENT INSTRUCTIONS
Knee Pain or Injury: Care Instructions  Your Care Instructions     Injuries are a common cause of knee problems. Sudden (acute) injuries may be caused by a direct blow to the knee. They can also be caused by abnormal twisting, bending, or falling on the knee. Pain, bruising, or swelling may be severe, and may start within minutes of the injury. Overuse is another cause of knee pain. Other causes are climbing stairs, kneeling, and other activities that use the knee. Everyday wear and tear, especially as you get older, also can cause knee pain. Rest, along with home treatment, often relieves pain and allows your knee to heal. If you have a serious knee injury, you may need tests and treatment. Follow-up care is a key part of your treatment and safety. Be sure to make and go to all appointments, and call your doctor if you are having problems. It's also a good idea to know your test results and keep a list of the medicines you take. How can you care for yourself at home? · Be safe with medicines. Read and follow all instructions on the label. ? If the doctor gave you a prescription medicine for pain, take it as prescribed. ? If you are not taking a prescription pain medicine, ask your doctor if you can take an over-the-counter medicine. · Rest and protect your knee. Take a break from any activity that may cause pain. · Put ice or a cold pack on your knee for 10 to 20 minutes at a time. Put a thin cloth between the ice and your skin. · Prop up a sore knee on a pillow when you ice it or anytime you sit or lie down for the next 3 days. Try to keep it above the level of your heart. This will help reduce swelling. · If your knee is not swollen, you can put moist heat, a heating pad, or a warm cloth on your knee. · If your doctor recommends an elastic bandage, sleeve, or other type of support for your knee, wear it as directed.   · Follow your doctor's instructions about how much weight you can put on your leg. Use a cane, crutches, or a walker as instructed. · Follow your doctor's instructions about activity during your healing process. If you can do mild exercise, slowly increase your activity. · Reach and stay at a healthy weight. Extra weight can strain the joints, especially the knees and hips, and make the pain worse. Losing even a few pounds may help. When should you call for help? Call 911 anytime you think you may need emergency care. For example, call if:    · You have symptoms of a blood clot in your lung (called a pulmonary embolism). These may include:  ? Sudden chest pain. ? Trouble breathing. ? Coughing up blood. Call your doctor now or seek immediate medical care if:    · You have severe or increasing pain.     · Your leg or foot turns cold or changes color.     · You cannot stand or put weight on your knee.     · Your knee looks twisted or bent out of shape.     · You cannot move your knee.     · You have signs of infection, such as:  ? Increased pain, swelling, warmth, or redness. ? Red streaks leading from the knee. ? Pus draining from a place on your knee. ? A fever.     · You have signs of a blood clot in your leg (called a deep vein thrombosis), such as:  ? Pain in your calf, back of the knee, thigh, or groin. ? Redness and swelling in your leg or groin. Watch closely for changes in your health, and be sure to contact your doctor if:    · You have tingling, weakness, or numbness in your knee.     · You have any new symptoms, such as swelling.     · You have bruises from a knee injury that last longer than 2 weeks.     · You do not get better as expected. Where can you learn more? Go to http://www.gray.com/  Enter K195 in the search box to learn more about \"Knee Pain or Injury: Care Instructions. \"  Current as of: February 26, 2020               Content Version: 12.8  © 8217-0197 [employer].    Care instructions adapted under license by [employer] (which disclaims liability or warranty for this information). If you have questions about a medical condition or this instruction, always ask your healthcare professional. [address] any warranty or liability for your use of this information.

## 2021-08-09 DIAGNOSIS — M25.562 LEFT KNEE PAIN, UNSPECIFIED CHRONICITY: ICD-10-CM

## 2021-08-09 DIAGNOSIS — M17.12 OSTEOARTHRITIS OF LEFT KNEE, UNSPECIFIED OSTEOARTHRITIS TYPE: Primary | ICD-10-CM

## 2021-08-09 RX ORDER — CEPHALEXIN 500 MG/1
500 CAPSULE ORAL
Qty: 4 CAPSULE | Refills: 0 | Status: SHIPPED | OUTPATIENT
Start: 2021-08-09 | End: 2021-08-09

## 2021-08-11 ENCOUNTER — TELEPHONE (OUTPATIENT)
Dept: ORTHOPEDIC SURGERY | Age: 76
End: 2021-08-11

## 2021-08-11 DIAGNOSIS — M17.12 OSTEOARTHRITIS OF LEFT KNEE, UNSPECIFIED OSTEOARTHRITIS TYPE: Primary | ICD-10-CM

## 2021-08-11 RX ORDER — CLINDAMYCIN HYDROCHLORIDE 300 MG/1
300 CAPSULE ORAL AS NEEDED
Qty: 2 CAPSULE | Refills: 0 | Status: SHIPPED | OUTPATIENT
Start: 2021-08-11 | End: 2021-08-12

## 2021-08-11 NOTE — TELEPHONE ENCOUNTER
PER PHARMACY, PATIENT HAS KEFLEX LISTED AS AN ALLERGY. I SENT IN CLINDAMYCIN. PATIENT'S PHONE WILL NOT CONNECT FOR ME TO ADVISE HER.

## 2021-08-11 NOTE — TELEPHONE ENCOUNTER
PT NEEDS AN ANTIBIOTIC FOR DENTIST APPT. WE CALLED IN KEFLEX BUT THE PHARMACY SAID THEY WERE CONCERNED FOR HER TO TAKE IT DUE TO HER OTHER MEDICATIONS SHE IS ON.      PHARMACY- [facility] IN [place], [place]

## 2022-03-19 PROBLEM — M17.9 KNEE OSTEOARTHRITIS: Status: ACTIVE | Noted: 2021-06-24

## 2022-07-12 ENCOUNTER — OFFICE VISIT (OUTPATIENT)
Dept: ORTHOPEDIC SURGERY | Age: 77
End: 2022-07-12
Payer: MEDICARE

## 2022-07-12 VITALS — HEIGHT: 65 IN | BODY MASS INDEX: 29.16 KG/M2 | WEIGHT: 175 LBS

## 2022-07-12 DIAGNOSIS — M25.562 LEFT KNEE PAIN, UNSPECIFIED CHRONICITY: Primary | ICD-10-CM

## 2022-07-12 PROCEDURE — 1090F PRES/ABSN URINE INCON ASSESS: CPT | Performed by: ORTHOPAEDIC SURGERY

## 2022-07-12 PROCEDURE — 1101F PT FALLS ASSESS-DOCD LE1/YR: CPT | Performed by: ORTHOPAEDIC SURGERY

## 2022-07-12 PROCEDURE — 99213 OFFICE O/P EST LOW 20 MIN: CPT | Performed by: ORTHOPAEDIC SURGERY

## 2022-07-12 PROCEDURE — G8399 PT W/DXA RESULTS DOCUMENT: HCPCS | Performed by: ORTHOPAEDIC SURGERY

## 2022-07-12 PROCEDURE — G8417 CALC BMI ABV UP PARAM F/U: HCPCS | Performed by: ORTHOPAEDIC SURGERY

## 2022-07-12 PROCEDURE — 3017F COLORECTAL CA SCREEN DOC REV: CPT | Performed by: ORTHOPAEDIC SURGERY

## 2022-07-12 PROCEDURE — G8756 NO BP MEASURE DOC: HCPCS | Performed by: ORTHOPAEDIC SURGERY

## 2022-07-12 PROCEDURE — G8432 DEP SCR NOT DOC, RNG: HCPCS | Performed by: ORTHOPAEDIC SURGERY

## 2022-07-12 PROCEDURE — G8427 DOCREV CUR MEDS BY ELIG CLIN: HCPCS | Performed by: ORTHOPAEDIC SURGERY

## 2022-07-12 PROCEDURE — 1123F ACP DISCUSS/DSCN MKR DOCD: CPT | Performed by: ORTHOPAEDIC SURGERY

## 2022-07-12 PROCEDURE — G8536 NO DOC ELDER MAL SCRN: HCPCS | Performed by: ORTHOPAEDIC SURGERY

## 2022-07-12 RX ORDER — ESTRADIOL 0.1 MG/G
CREAM VAGINAL
COMMUNITY
Start: 2022-04-19

## 2022-07-12 RX ORDER — CELECOXIB 200 MG/1
CAPSULE ORAL
COMMUNITY
Start: 2022-04-20

## 2022-07-12 NOTE — PATIENT INSTRUCTIONS
Knee Pain or Injury: Care Instructions  Your Care Instructions     Injuries are a common cause of knee problems. Sudden (acute) injuries may be caused by a direct blow to the knee. They can also be caused by abnormal twisting, bending, or falling on the knee. Pain, bruising, or swelling may be severe, and may start within minutes of the injury. Overuse is another cause of knee pain. Other causes are climbing stairs, kneeling, and other activities that use the knee. Everyday wear and tear, especially as you get older, also can cause knee pain. Rest, along with home treatment, often relieves pain and allows your knee to heal. If you have a serious knee injury, you may need tests and treatment. Follow-up care is a key part of your treatment and safety. Be sure to make and go to all appointments, and call your doctor if you are having problems. It's also a good idea to know your test results and keep a list of the medicines you take. How can you care for yourself at home? · Be safe with medicines. Read and follow all instructions on the label. ? If the doctor gave you a prescription medicine for pain, take it as prescribed. ? If you are not taking a prescription pain medicine, ask your doctor if you can take an over-the-counter medicine. · Rest and protect your knee. Take a break from any activity that may cause pain. · Put ice or a cold pack on your knee for 10 to 20 minutes at a time. Put a thin cloth between the ice and your skin. · Prop up a sore knee on a pillow when you ice it or anytime you sit or lie down for the next 3 days. Try to keep it above the level of your heart. This will help reduce swelling. · If your knee is not swollen, you can put moist heat, a heating pad, or a warm cloth on your knee. · If your doctor recommends an elastic bandage, sleeve, or other type of support for your knee, wear it as directed.   · Follow your doctor's instructions about how much weight you can put on your leg. Use a cane, crutches, or a walker as instructed. · Follow your doctor's instructions about activity during your healing process. If you can do mild exercise, slowly increase your activity. · Reach and stay at a healthy weight. Extra weight can strain the joints, especially the knees and hips, and make the pain worse. Losing even a few pounds may help. When should you call for help? Call 911 anytime you think you may need emergency care. For example, call if:    · You have symptoms of a blood clot in your lung (called a pulmonary embolism). These may include:  ? Sudden chest pain. ? Trouble breathing. ? Coughing up blood. Call your doctor now or seek immediate medical care if:    · You have severe or increasing pain.     · Your leg or foot turns cold or changes color.     · You cannot stand or put weight on your knee.     · Your knee looks twisted or bent out of shape.     · You cannot move your knee.     · You have signs of infection, such as:  ? Increased pain, swelling, warmth, or redness. ? Red streaks leading from the knee. ? Pus draining from a place on your knee. ? A fever.     · You have signs of a blood clot in your leg (called a deep vein thrombosis), such as:  ? Pain in your calf, back of the knee, thigh, or groin. ? Redness and swelling in your leg or groin. Watch closely for changes in your health, and be sure to contact your doctor if:    · You have tingling, weakness, or numbness in your knee.     · You have any new symptoms, such as swelling.     · You have bruises from a knee injury that last longer than 2 weeks.     · You do not get better as expected. Where can you learn more? Go to http://www.gray.com/  Enter K195 in the search box to learn more about \"Knee Pain or Injury: Care Instructions. \"  Current as of: July 1, 2021               Content Version: 13.2  © 2366-0675 [employer], [address].    Care instructions adapted under license by [employer] (which disclaims liability or warranty for this information). If you have questions about a medical condition or this instruction, always ask your healthcare professional. [address] any warranty or liability for your use of this information.

## 2022-07-12 NOTE — LETTER
7/18/2022    Patient: Lillian Shah   YOB: 1945   Date of Visit: 7/12/2022     Bre Pedroza MD  [address] [facility]  [address] [facility] [address]  Via Fax: [phone]    Dear Bre Pedroza MD,      Thank you for referring Ms. Phani Carney to [address] [place] [address] for evaluation. My notes for this consultation are attached. If you have questions, please do not hesitate to call me. I look forward to following your patient along with you.       Sincerely,    Preet Guzman MD

## 2022-07-12 NOTE — PROGRESS NOTES
Name: Cydney Hester    : 1945     Service Dept: [facility] and Sports Medicine    Chief Complaint   Patient presents with    Knee Pain     YEARLY F/U        Visit Vitals  Ht 5' 5\" (1.651 m)   Wt 175 lb (79.4 kg)   BMI 29.12 kg/m²        Allergies   Allergen Reactions    Cephalosporins Unknown (comments)     Pt states she is not allergic to this.  Cyclobenzaprine Other (comments)     dysphoria    Keflex [Cephalexin] Anxiety    Plaquenil [Hydroxychloroquine] Diarrhea    Sulfa (Sulfonamide Antibiotics) Unknown (comments)     Unsure of reaction. Current Outpatient Medications   Medication Sig Dispense Refill    celecoxib (CELEBREX) 200 mg capsule 1 CAPSULE WITH FOOD ORALLY TWICE A DAY 30 DAYS      estradioL (ESTRACE) 0.01 % (0.1 mg/gram) vaginal cream INSERT 1 GRAM BY VAGINAL ROUTE ONCE OR TWICE WEEKLY      ondansetron (ZOFRAN ODT) 4 mg disintegrating tablet Take 1 Tablet by mouth every eight (8) hours as needed for Nausea or Nausea or Vomiting. 10 Tablet 0    amLODIPine (NORVASC) 5 mg tablet TAKE 1 TABLET BY MOUTH EVERY DAY      atorvastatin (LIPITOR) 10 mg tablet TAKE 1 TABLET BY MOUTH EVERY DAY      methotrexate (RHEUMATREX) 2.5 mg tablet TAKE 6 TABLETS BY MOUTH EVERY WEEK ON  Tab 0    folic acid (FOLVITE) 1 mg tablet TAKE 1 TABLET BY MOUTH DAILY 90 Tab 3    pantoprazole (PROTONIX) 40 mg tablet TK 1 T PO QD  2    acetaminophen (TYLENOL) 650 mg CR tablet Take 1,300 mg by mouth daily as needed for Pain.  citalopram (CELEXA) 20 mg tablet Take  by mouth daily.  CALCIUM PHOSPHATE TRIB/VIT D3 (CITRACAL + D PO) Take 630 mg by mouth daily. D3 = 500 IU.  lisinopril (PRINIVIL, ZESTRIL) 20 mg tablet Take 20 mg by mouth daily.  hydrochlorothiazide (HYDRODIURIL) 25 mg tablet Take 25 mg by mouth daily.  loratadine (CLARITIN) 10 mg tablet Take 10 mg by mouth daily as needed.  cpap machine kit 1 Each by Does Not Apply route.  CPAP @@ 12 cm H2O qhs   Indications: SLEEP APNEA        Patient Active Problem List   Diagnosis Code    Pneumonia, organism unspecified(486) J18.9    HTN (hypertension) I10    Seronegative rheumatoid arthritis of multiple sites ([facility] [address] M06.09    Leukopenia D72.819    Long-term use of immunosuppressant medication Z79.899    Belching symptom R14.2    Carpal tunnel syndrome, left G56.02    Knee osteoarthritis M17.10      Family History   Problem Relation Age of Onset    Osteoporosis Mother     Dementia Mother         alzheimers    Cancer Father         ? where    Diabetes Father     Other Father         \"hardening of the arteries\"   Gloriajean Seeds Arthritis-rheumatoid Sister     OSTEOARTHRITIS Sister     Asthma Sister     Stroke Brother         brain aneurysm    Cancer Maternal Grandfather         bone marrow cancer      Social History     Socioeconomic History    Marital status:    Tobacco Use    Smoking status: Former Smoker    Smokeless tobacco: Never Used    Tobacco comment: former cigarette smoker - quit over 30 yrs ago   Substance and Sexual Activity    Alcohol use: Yes     Comment: moderate    Drug use: No    Sexual activity: Yes     Partners: Male      Past Surgical History:   Procedure Laterality Date    COLONOSCOPY      HC BLD CARPEL TUNNEL RELEASE  2003    right    HX APPENDECTOMY      HX CARPAL TUNNEL RELEASE Left 09/2016    HX COLONOSCOPY  March 2013    HX CYST REMOVAL      from right leg - lymphangioma    HX DILATION AND CURETTAGE      HX GYN      right ovary removed    HX ORTHOPAEDIC      left and right shoulder - acromioplasty/rotator cuff repair/bone spur    HX TONSILLECTOMY      HX TUBAL LIGATION      NEUROLOGICAL PROCEDURE UNLISTED      pt states she has not had a neurological procedure    SC BREAST SURGERY PROCEDURE UNLISTED      Biopsy breast (benign)      Past Medical History:   Diagnosis Date    Asthma     Autoimmune disease ([facility] [address]     RA    Benign breast cyst in female  Difficult intubation 1/2000    GERD (gastroesophageal reflux disease)     Hypertension     Lymphedema     right leg    Nausea & vomiting     YOEL on CPAP     Osteoarthritis 07/2009    Other ill-defined conditions(799.89)     wears surgical stocking on right leg    Other ill-defined conditions(799.89)     hx collapsed left lung    Pleurisy     Pneumonia     Psychiatric disorder     depression    Rheumatoid arthritis(714.0) 04/2009    Seasonal allergies     Sleep apnea     uses CPAP        I have reviewed and agree with [address] [place] [address] Nw and ROS and intake form in chart and the record furthermore I have reviewed prior medical record(s) regarding this patients care during this appointment. Review of Systems:   Patient is a pleasant appearing individual, appropriately dressed, well hydrated, well nourished, who is alert, appropriately oriented for age, and in no acute distress with a normal gait and normal affect who does not appear to be in any significant pain. Physical Exam:  Left knee - Neurovascularly intact with good cap refill, full range of motion and full strength, well healed incision noted, no swelling, no erythema, no instability. Right knee - Decrease range of motion with flexion, Some crepitation, Grossly neurovascularly intact, Good cap refill, No skin lesion, Moderate swelling, No gross instability, Some quadriceps weakness   Encounter Diagnoses     ICD-10-CM ICD-9-CM   1. Left knee pain, unspecified chronicity  M25.562 719.46       HPI:  The patient is here with a chief complaint of left knee pain, dull throbbing pain status post left total knee replacement, doing well, has no complaints. Pain is 0/10. Assessment/Plan:  Plan at this point, activities as tolerated started, no restrictions. We will see the patient back as needed and go from there.     As part of continued conservative pain management options the patient was advised to utilize Tylenol or OTC NSAIDS as long as it is not medically contraindicated. Return to Office: Follow-up and Dispositions    · Return if symptoms worsen or fail to improve. Scribed by Alhaji Rodrigez LPN as dictated by [facility] Griffith MD.  Documentation True and Accepted Richard Griffith MD

## 2024-05-28 ENCOUNTER — ANESTHESIA EVENT (OUTPATIENT)
Facility: HOSPITAL | Age: 79
End: 2024-05-28
Payer: MEDICARE

## 2024-05-28 ENCOUNTER — HOSPITAL ENCOUNTER (OUTPATIENT)
Facility: HOSPITAL | Age: 79
Setting detail: OUTPATIENT SURGERY
Discharge: HOME | End: 2024-05-28
Attending: INTERNAL MEDICINE | Admitting: INTERNAL MEDICINE
Payer: MEDICARE

## 2024-05-28 ENCOUNTER — ANESTHESIA (OUTPATIENT)
Facility: HOSPITAL | Age: 79
End: 2024-05-28
Payer: MEDICARE

## 2024-05-28 VITALS
WEIGHT: 166.9 LBS | SYSTOLIC BLOOD PRESSURE: 141 MMHG | BODY MASS INDEX: 27.81 KG/M2 | HEIGHT: 65 IN | HEART RATE: 75 BPM | OXYGEN SATURATION: 100 % | TEMPERATURE: 97.9 F | DIASTOLIC BLOOD PRESSURE: 79 MMHG | RESPIRATION RATE: 19 BRPM

## 2024-05-28 PROCEDURE — 6360000002 HC RX W HCPCS

## 2024-05-28 PROCEDURE — 3700000000 HC ANESTHESIA ATTENDED CARE: Performed by: INTERNAL MEDICINE

## 2024-05-28 PROCEDURE — 3700000001 HC ADD 15 MINUTES (ANESTHESIA): Performed by: INTERNAL MEDICINE

## 2024-05-28 PROCEDURE — 3600007511: Performed by: INTERNAL MEDICINE

## 2024-05-28 PROCEDURE — 7100000011 HC PHASE II RECOVERY - ADDTL 15 MIN: Performed by: INTERNAL MEDICINE

## 2024-05-28 PROCEDURE — 3600007501: Performed by: INTERNAL MEDICINE

## 2024-05-28 PROCEDURE — 2580000003 HC RX 258

## 2024-05-28 PROCEDURE — 2709999900 HC NON-CHARGEABLE SUPPLY: Performed by: INTERNAL MEDICINE

## 2024-05-28 PROCEDURE — 2500000003 HC RX 250 WO HCPCS

## 2024-05-28 PROCEDURE — 6370000000 HC RX 637 (ALT 250 FOR IP): Performed by: INTERNAL MEDICINE

## 2024-05-28 PROCEDURE — 88305 TISSUE EXAM BY PATHOLOGIST: CPT

## 2024-05-28 PROCEDURE — 7100000010 HC PHASE II RECOVERY - FIRST 15 MIN: Performed by: INTERNAL MEDICINE

## 2024-05-28 RX ORDER — SODIUM CHLORIDE 0.9 % (FLUSH) 0.9 %
5-40 SYRINGE (ML) INJECTION PRN
Status: DISCONTINUED | OUTPATIENT
Start: 2024-05-28 | End: 2024-05-28 | Stop reason: HOSPADM

## 2024-05-28 RX ORDER — SIMETHICONE 40MG/0.6ML
SUSPENSION, DROPS(FINAL DOSAGE FORM)(ML) ORAL PRN
Status: DISCONTINUED | OUTPATIENT
Start: 2024-05-28 | End: 2024-05-28 | Stop reason: ALTCHOICE

## 2024-05-28 RX ORDER — GLYCOPYRROLATE 0.2 MG/ML
INJECTION INTRAMUSCULAR; INTRAVENOUS PRN
Status: DISCONTINUED | OUTPATIENT
Start: 2024-05-28 | End: 2024-05-28 | Stop reason: SDUPTHER

## 2024-05-28 RX ORDER — LIDOCAINE HYDROCHLORIDE 20 MG/ML
INJECTION, SOLUTION EPIDURAL; INFILTRATION; INTRACAUDAL; PERINEURAL PRN
Status: DISCONTINUED | OUTPATIENT
Start: 2024-05-28 | End: 2024-05-28 | Stop reason: SDUPTHER

## 2024-05-28 RX ORDER — SODIUM CHLORIDE 9 MG/ML
25 INJECTION, SOLUTION INTRAVENOUS PRN
Status: DISCONTINUED | OUTPATIENT
Start: 2024-05-28 | End: 2024-05-28 | Stop reason: HOSPADM

## 2024-05-28 RX ORDER — SODIUM CHLORIDE 9 MG/ML
INJECTION, SOLUTION INTRAVENOUS CONTINUOUS
Status: DISCONTINUED | OUTPATIENT
Start: 2024-05-28 | End: 2024-05-28 | Stop reason: HOSPADM

## 2024-05-28 RX ORDER — SODIUM CHLORIDE 9 MG/ML
INJECTION, SOLUTION INTRAVENOUS CONTINUOUS PRN
Status: DISCONTINUED | OUTPATIENT
Start: 2024-05-28 | End: 2024-05-28 | Stop reason: SDUPTHER

## 2024-05-28 RX ORDER — SODIUM CHLORIDE 0.9 % (FLUSH) 0.9 %
5-40 SYRINGE (ML) INJECTION EVERY 12 HOURS SCHEDULED
Status: DISCONTINUED | OUTPATIENT
Start: 2024-05-28 | End: 2024-05-28 | Stop reason: HOSPADM

## 2024-05-28 RX ADMIN — PROPOFOL 25 MG: 10 INJECTION, EMULSION INTRAVENOUS at 07:49

## 2024-05-28 RX ADMIN — PROPOFOL 25 MG: 10 INJECTION, EMULSION INTRAVENOUS at 07:44

## 2024-05-28 RX ADMIN — PROPOFOL 25 MG: 10 INJECTION, EMULSION INTRAVENOUS at 07:54

## 2024-05-28 RX ADMIN — PROPOFOL 25 MG: 10 INJECTION, EMULSION INTRAVENOUS at 07:47

## 2024-05-28 RX ADMIN — PROPOFOL 50 MG: 10 INJECTION, EMULSION INTRAVENOUS at 07:32

## 2024-05-28 RX ADMIN — PROPOFOL 25 MG: 10 INJECTION, EMULSION INTRAVENOUS at 07:37

## 2024-05-28 RX ADMIN — PROPOFOL 25 MG: 10 INJECTION, EMULSION INTRAVENOUS at 07:35

## 2024-05-28 RX ADMIN — PROPOFOL 25 MG: 10 INJECTION, EMULSION INTRAVENOUS at 07:51

## 2024-05-28 RX ADMIN — PROPOFOL 25 MG: 10 INJECTION, EMULSION INTRAVENOUS at 07:41

## 2024-05-28 RX ADMIN — GLYCOPYRROLATE 0.2 MG: 0.2 INJECTION INTRAMUSCULAR; INTRAVENOUS at 07:35

## 2024-05-28 RX ADMIN — LIDOCAINE HYDROCHLORIDE 50 MG: 20 INJECTION, SOLUTION EPIDURAL; INFILTRATION; INTRACAUDAL; PERINEURAL at 07:32

## 2024-05-28 RX ADMIN — PROPOFOL 25 MG: 10 INJECTION, EMULSION INTRAVENOUS at 07:34

## 2024-05-28 RX ADMIN — SODIUM CHLORIDE: 9 INJECTION, SOLUTION INTRAVENOUS at 07:23

## 2024-05-28 ASSESSMENT — PAIN - FUNCTIONAL ASSESSMENT: PAIN_FUNCTIONAL_ASSESSMENT: NONE - DENIES PAIN

## 2024-05-28 NOTE — ANESTHESIA POSTPROCEDURE EVALUATION
Department of Anesthesiology  Postprocedure Note    Patient: Heather Walker  MRN: [id number]  YOB: 1945  Date of evaluation: 5/28/2024    Procedure Summary       Date: 05/28/24 Room / Location: [facility] 02 / [facility] ENDOSCOPY    Anesthesia Start: 0728 Anesthesia Stop: 0757    Procedure: COLONOSCOPY (Lower GI Region) Diagnosis:       Colon cancer screening      (Colon cancer screening [Z12.11])    Surgeons: Bear Shelley MD Responsible Provider: Rivera Mobley MD    Anesthesia Type: MAC ASA Status: 2            Anesthesia Type: MAC    Alejandrina Phase I: Alejandrina Score: 10    Alejandrina Phase II: Alejandrina Score: 9    Anesthesia Post Evaluation    Patient location during evaluation: bedside  Nausea & Vomiting: no nausea  Cardiovascular status: blood pressure returned to baseline  Respiratory status: acceptable  Hydration status: euvolemic    No notable events documented.

## 2024-05-28 NOTE — DISCHARGE INSTRUCTIONS
[facility]  Bear Shelley M.D.  [address], [place] [address] [phone]            COLONOSCOPY DISCHARGE INSTRUCTIONS    Heather Walker  [id number]  1945    DISCOMFORT:  Redness at IV site- apply warm compress to area; if redness or soreness persist- contact your physician  There may be a slight amount of blood passed from the rectum  Gaseous discomfort- walking, belching will help relieve any discomfort  You may not operate a vehicle for 12 hours  You may not engage in an occupation involving machinery or appliances for the  rest of today  You may not drink alcoholic beverages for at least 12 hours  Avoid making any critical decisions for at least 24 hours    DIET:   You may resume your normal diet, but some patients find that heavy or large meals may lead to indigestion or vomiting. I suggest a light meal as first food  intake. I recommend a whole food, plant-based diet for your overall health.    ACTIVITY:  You may resume your normal daily activities.  It is recommended that you spend the remainder of the day resting - avoid any strenuous activity.    CALL M.D. IF ANY SIGN OF:   Increasing pain, nausea, vomiting  Abdominal distension (swelling)  Significant bleeding (oral or rectal)  Fever   Pain in chest area  Shortness of breath    Additional Instructions:   Call Dr. Shelley if any questions or problems at [phone]   You should receive the biopsy results by phone or mail within 3 weeks, if not, call  my office for the results      Should have a repeat colonoscopy in 5-7 years if adenomatous polyp. Colonoscopy showed one polyp removed and internal hemorrhoids.

## 2024-05-28 NOTE — OP NOTE
[facility]  Bear Shelley M.D.  [address] [id number] [phone]               Colonoscopy Procedure Note    NAME: Heather Walker  :  1945  MRN:  [id number]    Indications:   Screening colonoscopy     : Bear Shelley MD    Referring Provider:  Ranjeet Jackson MD    Staff: Circulator: Daphne Shafer RN  Endoscopy Technician: Dipak Meraz    Prosthetic devices, grafts, tissues, transplant, or devices implanted: none    Medicines:  MAC anesthesia      Procedure Details:  After informed consent was obtained with all risks and benefits of the procedure explained and preprocedure exam completed, the patient was placed in the left lateral decubitus position.  Universal protocol for patient identification was performed and documented in the nursing notes.  Throughout the procedure, the patient's blood pressure was monitored at least every five minutes; pulse, and oxygen saturations were monitored continuously.  All vital signs were documented in the nursing notes.  A digital rectal exam was performed and was normal.  The Olympus videocolonoscope  was inserted in the rectum and carefully advanced to the cecum, which was identified by the ileocecal valve and appendiceal orifice. The colonoscope was slowly withdrawn with careful evaluation between folds. Retroflexion in the rectum and second examination of the right colon was performed; findings and interventions are described below. Procedure start time, extent reached time/cecum time, and procedure end time are documented in the nursing notes.  The quality of preparation was adequate.       Findings:   2 mm sessile polyp in the rectum s/p cold forceps polypectomy  Moderate internal hemorrhoids    Interventions:    1 complete polypectomy were performed using cold biopsy forceps and the polyps were  retrieved    Specimens:   ID Type Source Tests Collected by Time Destination   1 : rectal polyp Tissue Rectum

## 2024-05-28 NOTE — H&P
(TYLENOL) 650 MG extended release tablet Past Month  Yes No   Sig: Take 2 tablets by mouth daily as needed   amLODIPine (NORVASC) 5 MG tablet Past Week  Yes No   Sig: TAKE 1 TABLET BY MOUTH EVERY DAY   atorvastatin (LIPITOR) 10 MG tablet Past Week  Yes No   Sig: TAKE 1 TABLET BY MOUTH EVERY DAY   celecoxib (CELEBREX) 200 MG capsule Past Week  Yes No   Si CAPSULE WITH FOOD ORALLY TWICE A DAY 30 DAYS   citalopram (CELEXA) 20 MG tablet Past Week  Yes No   Sig: Take by mouth daily   estradiol (ESTRACE) 0.1 MG/GM vaginal cream   Yes No   Sig: INSERT 1 GRAM BY VAGINAL ROUTE ONCE OR TWICE WEEKLY   folic acid (FOLVITE) 1 MG tablet Past Week  Yes No   Sig: TAKE 1 TABLET BY MOUTH DAILY   hydroCHLOROthiazide (HYDRODIURIL) 25 MG tablet Past Week  Yes No   Sig: Take 1 tablet by mouth daily   lisinopril (PRINIVIL;ZESTRIL) 20 MG tablet Past Week  Yes No   Sig: Take 1 tablet by mouth daily   loratadine (CLARITIN) 10 MG tablet   Yes No   Sig: Take 10 mg by mouth daily as needed   metFORMIN (GLUCOPHAGE) 500 MG tablet Past Week  Yes Yes   Sig: Take 1 tablet by mouth 2 times daily (with meals)   methotrexate (RHEUMATREX) 2.5 MG chemo tablet Past Week  Yes No   Sig: TAKE 6 TABLETS BY MOUTH EVERY WEEK ON THURSDAY   ondansetron (ZOFRAN-ODT) 4 MG disintegrating tablet Not Taking  Yes No   Sig: Take 4 mg by mouth every 8 hours as needed   Patient not taking: Reported on 2024   pantoprazole (PROTONIX) 40 MG tablet Past Week  Yes No   Sig: TK 1 T PO QD      Facility-Administered Medications: None       Hospital Medications:  Current Facility-Administered Medications   Medication Dose Route Frequency    0.9 % sodium chloride infusion   IntraVENous Continuous    sodium chloride flush 0.9 % injection 5-40 mL  5-40 mL IntraVENous 2 times per day    sodium chloride flush 0.9 % injection 5-40 mL  5-40 mL IntraVENous PRN    0.9 % sodium chloride infusion  25 mL IntraVENous PRN     Facility-Administered Medications Ordered in Other

## 2024-05-28 NOTE — ANESTHESIA PRE PROCEDURE
Department of Anesthesiology  Preprocedure Note       Name:  Heather Walker   Age:  79 y.o.  :  1945                                          MRN:  [id number]         Date:  2024      Surgeon: Surgeon(s):  Bear Shelley MD    Procedure: Procedure(s):  COLONOSCOPY    Medications prior to admission:   Prior to Admission medications    Medication Sig Start Date End Date Taking? Authorizing Provider   metFORMIN (GLUCOPHAGE) 500 MG tablet Take 1 tablet by mouth 2 times daily (with meals)   Yes Provider, MD Bobo   acetaminophen (TYLENOL) 650 MG extended release tablet Take 2 tablets by mouth daily as needed    Automatic Reconciliation, Ar   amLODIPine (NORVASC) 5 MG tablet TAKE 1 TABLET BY MOUTH EVERY DAY 2/15/21   Automatic Reconciliation, Ar   atorvastatin (LIPITOR) 10 MG tablet TAKE 1 TABLET BY MOUTH EVERY DAY 3/15/21   Automatic Reconciliation, Ar   celecoxib (CELEBREX) 200 MG capsule 1 CAPSULE WITH FOOD ORALLY TWICE A DAY 30 DAYS 22   Automatic Reconciliation, Ar   citalopram (CELEXA) 20 MG tablet Take by mouth daily    Automatic Reconciliation, Ar   estradiol (ESTRACE) 0.1 MG/GM vaginal cream INSERT 1 GRAM BY VAGINAL ROUTE ONCE OR TWICE WEEKLY 22   Automatic Reconciliation, Ar   folic acid (FOLVITE) 1 MG tablet TAKE 1 TABLET BY MOUTH DAILY 17   Automatic Reconciliation, Ar   hydroCHLOROthiazide (HYDRODIURIL) 25 MG tablet Take 1 tablet by mouth daily 3/23/10   Automatic Reconciliation, Ar   lisinopril (PRINIVIL;ZESTRIL) 20 MG tablet Take 1 tablet by mouth daily    Automatic Reconciliation, Ar   methotrexate (RHEUMATREX) 2.5 MG chemo tablet TAKE 6 TABLETS BY MOUTH EVERY WEEK ON 17   Automatic Reconciliation, Ar   ondansetron (ZOFRAN-ODT) 4 MG disintegrating tablet Take 4 mg by mouth every 8 hours as needed  Patient not taking: Reported on 2024   Automatic Reconciliation, Ar   pantoprazole (PROTONIX) 40 MG tablet TK 1 T PO QD 16   Automatic 
Difficult intubation 1/2000   • GERD (gastroesophageal reflux disease)    • Hypertension    • Lymphedema     right leg   • Nausea & vomiting    • MIRTHA on CPAP    • Osteoarthritis 07/2009   • Other ill-defined conditions(799.89)     wears surgical stocking on right leg   • Other ill-defined conditions(799.89)     hx collapsed left lung   • Pleurisy    • Pneumonia    • Psychiatric disorder     depression   • Rheumatoid arthritis(714.0) 04/2009   • Seasonal allergies    • Sleep apnea     uses CPAP       Past Surgical History:        Procedure Laterality Date   • APPENDECTOMY     • BLD CARPEL TUNNEL RELEASE  2003    right   • BREAST SURGERY      Biopsy breast (benign)   • CARPAL TUNNEL RELEASE Left 09/2016   • COLONOSCOPY  March 2013   • COLONOSCOPY     • CYST REMOVAL      from right leg - lymphangioma   • DILATION AND CURETTAGE OF UTERUS     • GYN      right ovary removed   • NEUROLOGICAL SURGERY      pt states she has not had a neurological procedure   • ORTHOPEDIC SURGERY      left and right shoulder - acromioplasty/rotator cuff repair/bone spur   • OVARY REMOVAL     • TONSILLECTOMY     • TUBAL LIGATION         Social History:    Social History     Tobacco Use   • Smoking status: Former   • Smokeless tobacco: Never   • Tobacco comments:     Quit smoking: former cigarette smoker - quit over 30 yrs ago   Substance Use Topics   • Alcohol use: Yes                                Counseling given: Not Answered  Tobacco comments: Quit smoking: former cigarette smoker - quit over 30 yrs ago      Vital Signs (Current):   Vitals:    05/28/24 0715   BP: (!) 158/71   Pulse: 60   Resp: 18   Temp: 97.9 °F (36.6 °C)   TempSrc: Temporal   SpO2: 98%   Weight: 75.7 kg (166 lb 14.4 oz)   Height: 1.651 m (5' 5\")                                              BP Readings from Last 3 Encounters:   05/28/24 (!) 158/71       NPO Status: Time of last liquid consumption: 0319                        Time of last solid consumption: 1900            
arthritis:..                 Abdominal:             Vascular:          Other Findings:       Anesthesia Plan      MAC     ASA 2             Anesthetic plan and risks discussed with patient.                    Rivera Mobley MD   5/28/2024

## (undated) DEVICE — ZIMMER® STERILE DISPOSABLE TOURNIQUET CUFF WITH PLC, DUAL PORT, SINGLE BLADDER, 34 IN. (86 CM)

## (undated) DEVICE — SPONGE LAP SOFT 18X18 IN X RAY DETECTABLE

## (undated) DEVICE — SUTURE VCRL SZ 0 L27IN ABSRB UD L36MM CT-1 1/2 CIR J260H

## (undated) DEVICE — 4-PORT MANIFOLD: Brand: NEPTUNE 2

## (undated) DEVICE — SUPPLEMENT DIGESTIVE H2O SOL GI-EASE

## (undated) DEVICE — (D)HANDPIECE IRR W/HI FLO TIP -- DUPLICATE USE ITEM 121586

## (undated) DEVICE — SET ADMIN 16ML TBNG L100IN 2 Y INJ SITE IV PIGGY BK DISP

## (undated) DEVICE — ATTUNE SOLO PINNING SYSTEM

## (undated) DEVICE — BAG BELONG PT PERS CLEAR HANDL

## (undated) DEVICE — BNDG,ELSTC,MATRIX,STRL,6"X5YD,LF,HOOK&LP: Brand: MEDLINE

## (undated) DEVICE — RECIPROCATING BLADE HEAVY DUTY LONG, OFFSET  (77.6 X 0.77 X 11.2MM)

## (undated) DEVICE — TOTAL KNEE PACK: Brand: MEDLINE INDUSTRIES, INC.

## (undated) DEVICE — STRYKER PERFORMANCE SERIES SAGITTAL BLADE: Brand: STRYKER PERFORMANCE SERIES

## (undated) DEVICE — GARMENT COMPR M FOR 13IN FT INTMIT SGL BLDR HEM FORC II

## (undated) DEVICE — KENDALL RADIOLUCENT FOAM MONITORING ELECTRODE -RECTANGULAR SHAPE: Brand: KENDALL

## (undated) DEVICE — COVER,TABLE,HEAVY DUTY,77"X90",STRL: Brand: MEDLINE

## (undated) DEVICE — 3M™ TEGADERM™ HP TRANSPARENT FILM DRESSING FRAME STYLE, 9546HP, 4 IN X 4-1/2 IN (10 CM X 11.5 CM), 50/CT 4CT/CASE: Brand: 3M™ TEGADERM™

## (undated) DEVICE — WRAP KNEE UNIV E STRP HK AND LOOP W/ GEL PK MEDCOOL

## (undated) DEVICE — UNDERGLOVE SURG SZ 7.5 BLU LTX FREE SYN POLYISOPRENE

## (undated) DEVICE — STERILE POLYISOPRENE POWDER-FREE SURGICAL GLOVES: Brand: PROTEXIS

## (undated) DEVICE — ENDO CARRY-ON PROCEDURE KIT INCLUDES ENZYMATIC SPONGE, GAUZE, BIOHAZARD LABEL, TRAY, LUBRICANT, DIRTY SCOPE LABEL, WATER LABEL, TRAY, DRAWSTRING PAD, AND DEFENDO 4-PIECE KIT.: Brand: ENDO CARRY-ON PROCEDURE KIT

## (undated) DEVICE — SUTURE VCRL SZ 2-0 L27IN ABSRB UD L26MM CT-2 1/2 CIR J269H

## (undated) DEVICE — 1200 GUARD II KIT W/5MM TUBE W/O VAC TUBE: Brand: GUARDIAN

## (undated) DEVICE — GOWN,PRECEPT,XLNG/XXLARGE,STRL: Brand: MEDLINE

## (undated) DEVICE — KIT IV STRT W CHLORAPREP PD 1ML

## (undated) DEVICE — HYPODERMIC SAFETY NEEDLE: Brand: MAGELLAN

## (undated) DEVICE — GOWN,SIRUS,FABRNF,XL,20/CS: Brand: MEDLINE

## (undated) DEVICE — NEEDLE HYPO 18GA L1.5IN PNK S STL HUB POLYPR SHLD REG BVL

## (undated) DEVICE — 3M™ STERI-DRAPE™ INSTRUMENT POUCH 1018: Brand: STERI-DRAPE™

## (undated) DEVICE — DRAPE,TOP,102X53,STERILE: Brand: MEDLINE

## (undated) DEVICE — SUTURE MCRYL SZ 3-0 L27IN ABSRB UD L24MM PS-1 3/8 CIR PRIM Y936H

## (undated) DEVICE — BOWL BNE CEM MIX SPAT CURET SMARTMIX CTS

## (undated) DEVICE — FORCEPS BX L240CM JAW DIA2.8MM L CAP W/ NDL MIC MESH TOOTH

## (undated) DEVICE — HOOD WITH PEEL AWAY FACE SHIELD: Brand: T7PLUS

## (undated) DEVICE — SOL IRR NACL 0.9% 500ML POUR --

## (undated) DEVICE — SOLIDIFIER FLUID 3000 CC ABSORB

## (undated) DEVICE — BW-412T DISP COMBO CLEANING BRUSH: Brand: SINGLE USE COMBINATION CLEANING BRUSH

## (undated) DEVICE — SYRINGE MED 20ML STD CLR PLAS LUERLOCK TIP N CTRL DISP

## (undated) DEVICE — SUTURE VCRL SZ 1 L27IN ABSRB UD CT-1 L36MM 1/2 CIR J261H

## (undated) DEVICE — GLOVE SURG 7 BIOGEL PI ULTRATOUCH G

## (undated) DEVICE — CONTAINER SPEC 20 ML LID NEUT BUFF FORMALIN 10 % POLYPR STS

## (undated) DEVICE — SHEET,DRAPE,70X100,STERILE: Brand: MEDLINE

## (undated) DEVICE — BAG SPEC BIOHZD LF 2MIL 6X10IN -- CONVERT TO ITEM 357326

## (undated) DEVICE — HOOD, PEEL-AWAY: Brand: FLYTE

## (undated) DEVICE — CATH IV AUTOGRD BC BLU 22GA 25 -- INSYTE

## (undated) DEVICE — GOWN,AURORA,FABRIC-REINFORCED,X-LARGE: Brand: MEDLINE

## (undated) DEVICE — SKIN CLOS DERMABND PRINEO 60CM -- DERMABOUND PRINEO

## (undated) DEVICE — CANN NASAL O2 CAPNOGRAPHY AD -- FILTERLINE

## (undated) DEVICE — SET EXTN TBNG L BOR 4 W STPCOCK ST 32IN PRIMING VOL 6ML

## (undated) DEVICE — FORCEP BX JUMBO 2.8 MMX240 CM ORNG RADIAL JAW 4 M00513363

## (undated) DEVICE — 3M™ IOBAN™ 2 ANTIMICROBIAL INCISE DRAPE 6650EZ: Brand: IOBAN™ 2

## (undated) DEVICE — (D)PREP SKN CHLRAPRP APPL 26ML -- CONVERT TO ITEM 371833

## (undated) DEVICE — STERILE POLYISOPRENE POWDER-FREE SURGICAL GLOVES WITH EMOLLIENT COATING: Brand: PROTEXIS

## (undated) DEVICE — BLADE ELECTRODE: Brand: VALLEYLAB

## (undated) DEVICE — INTENDED FOR TISSUE SEPARATION, AND OTHER PROCEDURES THAT REQUIRE A SHARP SURGICAL BLADE TO PUNCTURE OR CUT.: Brand: BARD-PARKER SAFETY BLADES SIZE 10, STERILE

## (undated) DEVICE — DRESSING ANTIMIC FOAM OPTIFOAM POSTOP ADH 4X14 IN